# Patient Record
Sex: MALE | Race: OTHER | HISPANIC OR LATINO
[De-identification: names, ages, dates, MRNs, and addresses within clinical notes are randomized per-mention and may not be internally consistent; named-entity substitution may affect disease eponyms.]

---

## 2019-03-06 ENCOUNTER — TRANSCRIPTION ENCOUNTER (OUTPATIENT)
Age: 54
End: 2019-03-06

## 2019-04-04 ENCOUNTER — TRANSCRIPTION ENCOUNTER (OUTPATIENT)
Age: 54
End: 2019-04-04

## 2020-03-27 ENCOUNTER — TRANSCRIPTION ENCOUNTER (OUTPATIENT)
Age: 55
End: 2020-03-27

## 2020-04-04 ENCOUNTER — APPOINTMENT (OUTPATIENT)
Dept: DISASTER EMERGENCY | Facility: CLINIC | Age: 55
End: 2020-04-04
Payer: COMMERCIAL

## 2020-04-04 VITALS
SYSTOLIC BLOOD PRESSURE: 140 MMHG | OXYGEN SATURATION: 97 % | HEART RATE: 82 BPM | DIASTOLIC BLOOD PRESSURE: 80 MMHG | RESPIRATION RATE: 20 BRPM | TEMPERATURE: 97.7 F

## 2020-04-04 PROBLEM — Z00.00 ENCOUNTER FOR PREVENTIVE HEALTH EXAMINATION: Status: ACTIVE | Noted: 2020-04-04

## 2020-04-04 PROCEDURE — 99202 OFFICE O/P NEW SF 15 MIN: CPT

## 2020-04-04 NOTE — HISTORY OF PRESENT ILLNESS
[Patient presents to the office today for COVID-19 evaluation and testing.] : Patient presents to the office today for COVID-19 evaluation and testing. [] : mild dizziness on standing [With Confirmed Case] : patient exposed to a confirmed case of COVID-19 [None] : none [Clear] : clear [Good Air Entry] : good air entry [Speaks in full sentences] : speaks in full sentences [Normal O2 sat at rest] : normal O2 sat at rest [Grossly normal, interacts, not tired or weak] : grossly normal, interacts, not tired or weak [Discharged with current Quarantine instructions and advised of signs of worsening illness.] : Patient discharged with current quarantine instructions and advised of signs of worsening illness. Patient told to seek emergent care if symptoms occur. [[id number]] : headache, tightness chest

## 2020-05-02 ENCOUNTER — INPATIENT (INPATIENT)
Facility: HOSPITAL | Age: 55
LOS: 3 days | Discharge: HOME | DRG: 246 | End: 2020-05-06
Attending: HOSPITALIST
Payer: COMMERCIAL

## 2020-05-02 VITALS
HEART RATE: 68 BPM | TEMPERATURE: 98 F | DIASTOLIC BLOOD PRESSURE: 91 MMHG | SYSTOLIC BLOOD PRESSURE: 188 MMHG | OXYGEN SATURATION: 100 % | WEIGHT: 149.91 LBS | RESPIRATION RATE: 20 BRPM | HEIGHT: 65 IN

## 2020-05-02 DIAGNOSIS — I21.4 NON-ST ELEVATION (NSTEMI) MYOCARDIAL INFARCTION: ICD-10-CM

## 2020-05-02 DIAGNOSIS — R09.89 OTHER SPECIFIED SYMPTOMS AND SIGNS INVOLVING THE CIRCULATORY AND RESPIRATORY SYSTEMS: ICD-10-CM

## 2020-05-02 LAB
ALBUMIN SERPL ELPH-MCNC: 3.9 G/DL — SIGNIFICANT CHANGE UP (ref 3.3–5.2)
ALP SERPL-CCNC: 92 U/L — SIGNIFICANT CHANGE UP (ref 40–120)
ALT FLD-CCNC: 35 U/L — SIGNIFICANT CHANGE UP
ANION GAP SERPL CALC-SCNC: 17 MMOL/L — SIGNIFICANT CHANGE UP (ref 5–17)
AST SERPL-CCNC: 27 U/L — SIGNIFICANT CHANGE UP
BASOPHILS # BLD AUTO: 0.04 K/UL — SIGNIFICANT CHANGE UP (ref 0–0.2)
BASOPHILS NFR BLD AUTO: 0.5 % — SIGNIFICANT CHANGE UP (ref 0–2)
BILIRUB SERPL-MCNC: <0.2 MG/DL — LOW (ref 0.4–2)
BUN SERPL-MCNC: 15 MG/DL — SIGNIFICANT CHANGE UP (ref 8–20)
CALCIUM SERPL-MCNC: 9.7 MG/DL — SIGNIFICANT CHANGE UP (ref 8.6–10.2)
CHLORIDE SERPL-SCNC: 97 MMOL/L — LOW (ref 98–107)
CK SERPL-CCNC: 173 U/L — SIGNIFICANT CHANGE UP (ref 30–200)
CO2 SERPL-SCNC: 22 MMOL/L — SIGNIFICANT CHANGE UP (ref 22–29)
CREAT SERPL-MCNC: 0.88 MG/DL — SIGNIFICANT CHANGE UP (ref 0.5–1.3)
EOSINOPHIL # BLD AUTO: 0.16 K/UL — SIGNIFICANT CHANGE UP (ref 0–0.5)
EOSINOPHIL NFR BLD AUTO: 1.8 % — SIGNIFICANT CHANGE UP (ref 0–6)
GLUCOSE SERPL-MCNC: 191 MG/DL — HIGH (ref 70–99)
HCT VFR BLD CALC: 42.9 % — SIGNIFICANT CHANGE UP (ref 39–50)
HGB BLD-MCNC: 14.1 G/DL — SIGNIFICANT CHANGE UP (ref 13–17)
IMM GRANULOCYTES NFR BLD AUTO: 0.3 % — SIGNIFICANT CHANGE UP (ref 0–1.5)
LIDOCAIN IGE QN: 44 U/L — SIGNIFICANT CHANGE UP (ref 22–51)
LYMPHOCYTES # BLD AUTO: 2.37 K/UL — SIGNIFICANT CHANGE UP (ref 1–3.3)
LYMPHOCYTES # BLD AUTO: 27.1 % — SIGNIFICANT CHANGE UP (ref 13–44)
MCHC RBC-ENTMCNC: 28.1 PG — SIGNIFICANT CHANGE UP (ref 27–34)
MCHC RBC-ENTMCNC: 32.9 GM/DL — SIGNIFICANT CHANGE UP (ref 32–36)
MCV RBC AUTO: 85.6 FL — SIGNIFICANT CHANGE UP (ref 80–100)
MONOCYTES # BLD AUTO: 0.68 K/UL — SIGNIFICANT CHANGE UP (ref 0–0.9)
MONOCYTES NFR BLD AUTO: 7.8 % — SIGNIFICANT CHANGE UP (ref 2–14)
NEUTROPHILS # BLD AUTO: 5.45 K/UL — SIGNIFICANT CHANGE UP (ref 1.8–7.4)
NEUTROPHILS NFR BLD AUTO: 62.5 % — SIGNIFICANT CHANGE UP (ref 43–77)
PLATELET # BLD AUTO: 234 K/UL — SIGNIFICANT CHANGE UP (ref 150–400)
POTASSIUM SERPL-MCNC: 4.1 MMOL/L — SIGNIFICANT CHANGE UP (ref 3.5–5.3)
POTASSIUM SERPL-SCNC: 4.1 MMOL/L — SIGNIFICANT CHANGE UP (ref 3.5–5.3)
PROT SERPL-MCNC: 7.2 G/DL — SIGNIFICANT CHANGE UP (ref 6.6–8.7)
RBC # BLD: 5.01 M/UL — SIGNIFICANT CHANGE UP (ref 4.2–5.8)
RBC # FLD: 14.2 % — SIGNIFICANT CHANGE UP (ref 10.3–14.5)
SODIUM SERPL-SCNC: 136 MMOL/L — SIGNIFICANT CHANGE UP (ref 135–145)
TROPONIN T SERPL-MCNC: 0.24 NG/ML — HIGH (ref 0–0.06)
TROPONIN T SERPL-MCNC: <0.01 NG/ML — SIGNIFICANT CHANGE UP (ref 0–0.06)
WBC # BLD: 8.73 K/UL — SIGNIFICANT CHANGE UP (ref 3.8–10.5)
WBC # FLD AUTO: 8.73 K/UL — SIGNIFICANT CHANGE UP (ref 3.8–10.5)

## 2020-05-02 PROCEDURE — 99223 1ST HOSP IP/OBS HIGH 75: CPT

## 2020-05-02 PROCEDURE — 71275 CT ANGIOGRAPHY CHEST: CPT | Mod: 26

## 2020-05-02 PROCEDURE — 93010 ELECTROCARDIOGRAM REPORT: CPT | Mod: 76

## 2020-05-02 PROCEDURE — 71045 X-RAY EXAM CHEST 1 VIEW: CPT | Mod: 26

## 2020-05-02 PROCEDURE — 99285 EMERGENCY DEPT VISIT HI MDM: CPT

## 2020-05-02 RX ORDER — ASPIRIN/CALCIUM CARB/MAGNESIUM 324 MG
81 TABLET ORAL DAILY
Refills: 0 | Status: DISCONTINUED | OUTPATIENT
Start: 2020-05-03 | End: 2020-05-06

## 2020-05-02 RX ORDER — LISINOPRIL 2.5 MG/1
2.5 TABLET ORAL DAILY
Refills: 0 | Status: DISCONTINUED | OUTPATIENT
Start: 2020-05-03 | End: 2020-05-06

## 2020-05-02 RX ORDER — ATORVASTATIN CALCIUM 80 MG/1
40 TABLET, FILM COATED ORAL AT BEDTIME
Refills: 0 | Status: DISCONTINUED | OUTPATIENT
Start: 2020-05-02 | End: 2020-05-06

## 2020-05-02 RX ORDER — ENOXAPARIN SODIUM 100 MG/ML
68 INJECTION SUBCUTANEOUS ONCE
Refills: 0 | Status: COMPLETED | OUTPATIENT
Start: 2020-05-02 | End: 2020-05-02

## 2020-05-02 RX ORDER — METOPROLOL TARTRATE 50 MG
25 TABLET ORAL
Refills: 0 | Status: DISCONTINUED | OUTPATIENT
Start: 2020-05-03 | End: 2020-05-06

## 2020-05-02 RX ADMIN — ENOXAPARIN SODIUM 68 MILLIGRAM(S): 100 INJECTION SUBCUTANEOUS at 18:04

## 2020-05-02 RX ADMIN — ATORVASTATIN CALCIUM 40 MILLIGRAM(S): 80 TABLET, FILM COATED ORAL at 20:55

## 2020-05-02 NOTE — ED ADULT NURSE NOTE - OBJECTIVE STATEMENT
Patient reports he tested positive twice for Covid19, presents to ED with chest pressure and states he was diaphoretic.  Patient denies nausea, abdominal pain, diarrhea.  EKG performed, placed on cardiac monitor.  No difficulty breathing.

## 2020-05-02 NOTE — ED PROVIDER NOTE - PROGRESS NOTE DETAILS
2nd set results noted as postitive indicating NSTEMI.  Patient still with pain 5-6/10 in severity.  EKG in progress.  Patient states about 2 hours ago he began feeling severe symptoms similar to when he had to call 911 he alerted staff and was told his vitals were stable. Patient reports that he did have a CT scan 4 days ago as outpatient but was not given results.

## 2020-05-02 NOTE — H&P ADULT - HISTORY OF PRESENT ILLNESS
HPI: Patient is a 54 y/o male with a recent history of COVID19 infection admitted to the ED with "Chest Pressure" that had been present on and off for two which acutely worsened today with diaphoresis and light headedness. He is to be admitted with a finding of elevated troponin levels and for further evaluation for possible ACS and to r/o PE.     He was seen and examined at bedside, A and O X 3 and currently expericing the same, if milder sensation of a poorly described pressure-like sensation located midsternally and wrapping around both sides of his chest. He states that he first noted this sensation about two weeks as he was recovering from COVID19 infection (verified positive). The pain/pressure was initially mild, intermittent in duration, occuring both at rest and with activity and without any associated relieving/exacerbating factors. He denies any associated dyspnea on exertion,cough, sputum production, palpitations, nausea, vomiting, dizziness/light headeness or any syncopal episode. He had contacted a Pulmonary physician in the middle of April and was prescribed a five day course of Prednisone and an inhaler which did not alleviate his symptoms. He was sent in for what appears to be a CT-Angio Chest, presumable to r/o PE on 2020 but he does not know the result as yet. Today, at around 10:00 AM, he was walking outside at a normal level of activity when the chest pressure return but of severe intensity and continued, this time associated with light headeness without any sycope. He again denies any associated dyspnea or palpitations. EMS were summoned and patient recieved a loading dose of ASA en route. He denies any recent prolonged travel, leg swelling or calf tenderness and denies any personal or family history of blood clots. No prior history of known CAD, but he states that he has not seen a doctor in some time. He states that he might have had elevated cholesterol some time back but is currently not on any medication. He notes that his father  of heart complications in his 30s but no other family history. He is a non-smoker.     Course in ED: On admission, patient was normotensive, afebrile and saturating at 99% on Rm Air. He experienced a recurrence of chest pain at around 1 PM in the ED that resolved spontaneously. Labs on admission were notable for: Initial negative troponin which increased to .24 on subsequent draw. Hyperglycemia (190). EKG does not evidence any ST-T changes suggestive of ischemia. CXR is negative for any acute process. CT-Angiogram Chest PE protocol was ordered in ED and patient recieved one dose of therapeutic Lovenox.     ROS: 10 point review of systems was performed and is negative except as described in HPI

## 2020-05-02 NOTE — H&P ADULT - NSHPPHYSICALEXAM_GEN_ALL_CORE
General: Well built, well nourished Male, A and O x 3, In no acute distress  HEENT: Sclera anicteric, conjunctiva clear, oral mucosa moist, no JVD appreciated, no palpable lymphadenopathy  CVS: S1, S2 heard, regular rate and rhythm, no evident murmur  Lungs: Bilateral air entry without any wheezing or rales  Abdomen: Soft, non-tender, no rebound or other peritoneal signs, BS +  Extremities: Without edema or cyanosis, pedal pulses palpable  Skin: Without evident skin breaks or rashes  Neurologic: A and O X 3, No focal Neurological deficit appreciated

## 2020-05-02 NOTE — CONSULT NOTE ADULT - PROBLEM SELECTOR RECOMMENDATION 9
-trend cardiac markers  -continue therapeutic Lovenox, last dose at 1800 on Sunday  -NPO post midnight Sunday for cardiac cath on Monday  -EKG in am and with chest pain  -TTE in am  -start Metoprolol 25 mg bid  -start ASA 81 mg daily  -start Lipitor 40 mg qHS  -Hgb A1C, lipid panel, TSH in am    Preliminary evaluation, Dr. Edwards will see pt in am.

## 2020-05-02 NOTE — ED ADULT NURSE REASSESSMENT NOTE - NS ED NURSE REASSESS COMMENT FT1
Patient VSS, reports having "cold flashes", he also states "maybe it is a reaction to his inhaler this morning", states he took two puffs at 0730 today, patient is anxious-advised patient labs, vitals, ekg are within normal limits.

## 2020-05-02 NOTE — ED PROVIDER NOTE - OBJECTIVE STATEMENT
This patient is a 55 year old man with no significant past medical history who presents to the ER c/o chest pain, diaphoresis and nausea.  Patient is positive for covid 19.  He has been experiencing chest tightness for the past 2 weeks.  He states that he was initially prescribed prednisone and completed the course but his symptoms continued he was then prescribed an inhaler.  Today while walking around his yard he began feeling chest discomfort described as pressure over his entire chest.  Associated symptoms include diaphoresis and nausea.  Patient was given aspiring 324 mg of 4 mg of zofran en route to the hospital.

## 2020-05-02 NOTE — CONSULT NOTE ADULT - SUBJECTIVE AND OBJECTIVE BOX
History obtained by: Patient and medical record     obtained: No    Chief complaint:  "My chest hurts"    HPI:  This is 56 y/o male with recent COVID-19 infection (dx 20), not on any home medications who presents with midsternal chest pain. Pt was outside walking when he suddenly felt chest pain radiating to b/l jaws, associated with nausea and diaphoresis. Pt received  mg en route. Pt states he has been experiencing some chest heaviness for the past couple of weeks which was attributed to COVID-19 and he was treated with steroids and inhalers but today his chest pain was different and much more severe.   In the ER pt was found to have elevated second troponin 0.24 (first set negative). EKG shows SR with no acute ST changes. Chest CTA negative for PE. Chest pain is now improved without any intervention. Vital signs stable.        REVIEW OF SYMPTOMS:   Cardiovascular:  as per HPI  Respiratory:  denies dyspnea,   cough,     Genitourinary:  No dysuria, no hematuria;   Gastrointestinal:   No dark color stool, no melena, no diarrhea, no constipation, no abdominal pain;   Neurological: No headache, no dizziness, no slurred speech;    Psychiatric: No agitation, no anxiety.  ALL OTHER REVIEW OF SYSTEMS ARE NEGATIVE.    MEDICATIONS  (home); none        PAST MEDICAL & SURGICAL HISTORY:  No pertinent past medical history: Hyperlipidemia  No significant past surgical history      FAMILY HISTORY:  Father  of MI (?) at 37 y/o      SOCIAL HISTORY: lives with wife    CIGARETTES: never smoked    ALCOHOL: denies    DRUGS: denies    Vital Signs Last 24 Hrs  T(C): 37.2 (02 May 2020 20:42), Max: 37.2 (02 May 2020 20:42)  T(F): 98.9 (02 May 2020 20:42), Max: 98.9 (02 May 2020 20:42)  HR: 66 (02 May 2020 20:42) (58 - 74)  BP: 145/87 (02 May 2020 20:42) (129/73 - 188/91)  BP(mean): --  RR: 18 (02 May 2020 20:42) (18 - 20)  SpO2: 96% (02 May 2020 20:42) (96% - 100%)    PHYSICAL EXAM:  General: WN/WD NAD  Neurology: A&Ox3, nonfocal, BOWLES x 4  Eyes: PERRL/ EOMI, Gross vision intact  ENT/Neck: Neck supple, trachea midline, No JVD, Gross hearing intact  Respiratory: CTA B/L, No wheezing, rales, rhonchi  CV: RRR, S1S2, no murmurs, rubs or gallops  Abdominal: Soft, NT, ND +BS,   Extremities: No edema, + peripheral pulses  Skin: No Rashes, Hematoma, Ecchymosis        INTERPRETATION OF TELEMETRY: SR 60's-70's with occasional PVCs    ECG: SB 59 bpm, no acute ST changes        I&O's Detail      LABS:                        14.1   8.73  )-----------( 234      ( 02 May 2020 11:50 )             42.9     05-    136  |  97<L>  |  15.0  ----------------------------<  191<H>  4.1   |  22.0  |  0.88    Ca    9.7      02 May 2020 11:50    TPro  7.2  /  Alb  3.9  /  TBili  <0.2<L>  /  DBili  x   /  AST  27  /  ALT  35  /  AlkPhos  92  05-02    CARDIAC MARKERS ( 02 May 2020 15:36 )  x     / 0.24 ng/mL / x     / x     / x      CARDIAC MARKERS ( 02 May 2020 11:50 )  x     / <0.01 ng/mL / 173 U/L / x     / 2.7 ng/mL          I&O's Summary      RADIOLOGY & ADDITIONAL STUDIES:  X-ray:    < from: CT Angio Chest w/ IV Cont (20 @ 20:27) >  IMPRESSION:     CT findings of pulmonary infection/inflammation compatible with many entities (C19V-2).    No pulmonary embolus.    < end of copied text >    PREVIOUS DIAGNOSTIC TESTING:      ECHO: n/a    STRESS: n/a      CATHETERIZATION: n/a

## 2020-05-02 NOTE — ED ADULT TRIAGE NOTE - CHIEF COMPLAINT QUOTE
pt BIBA from home with c/o 5/10 midsternal chest pain, diaphoresis & nausea that started this AM. Pt tested + for covid on 4/5 and 4/23. Pt rcvd 324 ASA and 4mg of zofran by EMS and states started albuterol inhaler 3 days ago. Pt states history of heart murmur. No s/s of respiratory distress noted.

## 2020-05-02 NOTE — ED ADULT NURSE REASSESSMENT NOTE - NS ED NURSE REASSESS COMMENT FT1
Pt received @ 1930, pt a&ox4 , on cardiac monitor/  VSS. afebrile, no complaints of sob or do at this time. pt had an episode of cp/dizziness today and increased trop., pt pending urgent CTA. pt is admitted to 5twr, pt is covid +, isolation precautions maintained. pt to be NPO @ midnight for SAURABH, 5twr Rn aware.

## 2020-05-02 NOTE — H&P ADULT - ASSESSMENT
Chest pain/pressure; Atypical, Non-anginal; R/o ACS  Elevated troponin; Sarabjit pattern; NSTEMI, r/o Pulmonary Embolism  Recent COVID19 infection; Potential sequelae  H/o Hyperlipidemia; Not on treatment  - Telemetry monitoring  - Trend Serial Troponins q 6 hrly; EKG STAT for any recurrence of Chest pain  - CT-Angio Chest STAT  - S/p one dose 1 mg/kg Lovenox, continue pending Cadiology eval and CT-Angio result  - Aspirin loading dose 325 mg given with EMS, continue 81 mg daily  - Lipid profile; Lipitor 40 mg HS  - Metoprolol 12.5 mg BID, Lisinopril 5 mg daily with holding parameters  - 2-D ECHO to evaluate LV function/WMA  - Cardiology consultation  - Lifestyle intervention    Hyperglycemia, r/o DM Type 2  - Check HbA1c    Prophylaxis: Lovenox, one dose given, continue appropriate pharmacological Ppx pending studies/consult, SCDs    Diet: Cardiac diet, NPO past midnight    Activity: As tolerated    Code Status: FULL    More than 60 minutes were spend in the evaluation and care of the patient    Relevant diagnositcs and plan of treatment were discussed with the patient and available family to the best of my ability and pertinent questions were answered.    Patient has been screened for the following if applicable:   - Smoking:   - HIV status:   - Hypertension Chest pain/pressure; Atypical, Non-anginal; R/o ACS  Elevated troponin; Sarabjit pattern; NSTEMI, r/o Pulmonary Embolism  Recent COVID19 infection; Potential sequelae  H/o Hyperlipidemia; Not on treatment  - Telemetry monitoring  - Trend Serial Troponins q 6 hrly; EKG STAT for any recurrence of Chest pain  - CT-Angio Chest STAT  - S/p one dose 1 mg/kg Lovenox, continue pending Cadiology eval and CT-Angio result  - Aspirin loading dose 325 mg given with EMS, continue 81 mg daily  - Lipid profile; Lipitor 40 mg HS  - Metoprolol 12.5 mg BID, Lisinopril 5 mg daily with holding parameters  - 2-D ECHO to evaluate LV function/WMA  - Cardiology consultation: [facility] consult placed  - Lifestyle intervention    Hyperglycemia, r/o DM Type 2  - Check HbA1c    Prophylaxis: Lovenox, one dose given, continue appropriate pharmacological Ppx pending studies/consult, SCDs    Diet: Cardiac diet, NPO past midnight    Activity: As tolerated    Code Status: FULL    More than 60 minutes were spend in the evaluation and care of the patient    Relevant diagnositcs and plan of treatment were discussed with the patient and available family to the best of my ability and pertinent questions were answered.    Patient has been screened for the following if applicable:   - Smoking:   - HIV status:   - Hypertension

## 2020-05-02 NOTE — ED PROVIDER NOTE - CLINICAL SUMMARY MEDICAL DECISION MAKING FREE TEXT BOX
55 year old man with chest pain, SOB and diaphoresis.  Patient well appearing at this time, lungs clear EKG non-ischemic.  1st troponin negative 2nd set of troponin positive.  Patient given lovenox and admitted.

## 2020-05-03 LAB
A1C WITH ESTIMATED AVERAGE GLUCOSE RESULT: 5.9 % — HIGH (ref 4–5.6)
ANION GAP SERPL CALC-SCNC: 17 MMOL/L — SIGNIFICANT CHANGE UP (ref 5–17)
BUN SERPL-MCNC: 11 MG/DL — SIGNIFICANT CHANGE UP (ref 8–20)
CALCIUM SERPL-MCNC: 9.6 MG/DL — SIGNIFICANT CHANGE UP (ref 8.6–10.2)
CHLORIDE SERPL-SCNC: 100 MMOL/L — SIGNIFICANT CHANGE UP (ref 98–107)
CHOLEST SERPL-MCNC: 233 MG/DL — HIGH (ref 110–199)
CO2 SERPL-SCNC: 24 MMOL/L — SIGNIFICANT CHANGE UP (ref 22–29)
CREAT SERPL-MCNC: 0.86 MG/DL — SIGNIFICANT CHANGE UP (ref 0.5–1.3)
CRP SERPL-MCNC: <0.4 MG/DL — SIGNIFICANT CHANGE UP (ref 0–0.4)
D DIMER BLD IA.RAPID-MCNC: <150 NG/ML DDU — SIGNIFICANT CHANGE UP
ESTIMATED AVERAGE GLUCOSE: 123 MG/DL — HIGH (ref 68–114)
FERRITIN SERPL-MCNC: 134 NG/ML — SIGNIFICANT CHANGE UP (ref 30–400)
GLUCOSE SERPL-MCNC: 94 MG/DL — SIGNIFICANT CHANGE UP (ref 70–99)
HCT VFR BLD CALC: 46.8 % — SIGNIFICANT CHANGE UP (ref 39–50)
HDLC SERPL-MCNC: 41 MG/DL — SIGNIFICANT CHANGE UP
HGB BLD-MCNC: 15.1 G/DL — SIGNIFICANT CHANGE UP (ref 13–17)
LIPID PNL WITH DIRECT LDL SERPL: 155 MG/DL — SIGNIFICANT CHANGE UP
MCHC RBC-ENTMCNC: 27.8 PG — SIGNIFICANT CHANGE UP (ref 27–34)
MCHC RBC-ENTMCNC: 32.3 GM/DL — SIGNIFICANT CHANGE UP (ref 32–36)
MCV RBC AUTO: 86.2 FL — SIGNIFICANT CHANGE UP (ref 80–100)
PLATELET # BLD AUTO: 241 K/UL — SIGNIFICANT CHANGE UP (ref 150–400)
POTASSIUM SERPL-MCNC: 4.3 MMOL/L — SIGNIFICANT CHANGE UP (ref 3.5–5.3)
POTASSIUM SERPL-SCNC: 4.3 MMOL/L — SIGNIFICANT CHANGE UP (ref 3.5–5.3)
PROCALCITONIN SERPL-MCNC: 0.05 NG/ML — SIGNIFICANT CHANGE UP (ref 0.02–0.1)
RBC # BLD: 5.43 M/UL — SIGNIFICANT CHANGE UP (ref 4.2–5.8)
RBC # FLD: 14.6 % — HIGH (ref 10.3–14.5)
SARS-COV-2 RNA SPEC QL NAA+PROBE: DETECTED
SODIUM SERPL-SCNC: 140 MMOL/L — SIGNIFICANT CHANGE UP (ref 135–145)
TOTAL CHOLESTEROL/HDL RATIO MEASUREMENT: 6 RATIO — SIGNIFICANT CHANGE UP (ref 3.4–9.6)
TRIGL SERPL-MCNC: 186 MG/DL — SIGNIFICANT CHANGE UP (ref 10–200)
TROPONIN T SERPL-MCNC: 3.16 NG/ML — HIGH (ref 0–0.06)
TROPONIN T SERPL-MCNC: 3.8 NG/ML — HIGH (ref 0–0.06)
TSH SERPL-MCNC: 4.74 UIU/ML — HIGH (ref 0.27–4.2)
WBC # BLD: 9.75 K/UL — SIGNIFICANT CHANGE UP (ref 3.8–10.5)
WBC # FLD AUTO: 9.75 K/UL — SIGNIFICANT CHANGE UP (ref 3.8–10.5)

## 2020-05-03 PROCEDURE — 99233 SBSQ HOSP IP/OBS HIGH 50: CPT

## 2020-05-03 PROCEDURE — 93010 ELECTROCARDIOGRAM REPORT: CPT

## 2020-05-03 PROCEDURE — 99222 1ST HOSP IP/OBS MODERATE 55: CPT

## 2020-05-03 PROCEDURE — 93306 TTE W/DOPPLER COMPLETE: CPT | Mod: 26

## 2020-05-03 RX ORDER — ENOXAPARIN SODIUM 100 MG/ML
68 INJECTION SUBCUTANEOUS
Refills: 0 | Status: DISCONTINUED | OUTPATIENT
Start: 2020-05-03 | End: 2020-05-03

## 2020-05-03 RX ORDER — ENOXAPARIN SODIUM 100 MG/ML
70 INJECTION SUBCUTANEOUS
Refills: 0 | Status: COMPLETED | OUTPATIENT
Start: 2020-05-03 | End: 2020-05-03

## 2020-05-03 RX ADMIN — Medication 81 MILLIGRAM(S): at 18:02

## 2020-05-03 RX ADMIN — ENOXAPARIN SODIUM 70 MILLIGRAM(S): 100 INJECTION SUBCUTANEOUS at 18:02

## 2020-05-03 RX ADMIN — ENOXAPARIN SODIUM 70 MILLIGRAM(S): 100 INJECTION SUBCUTANEOUS at 05:39

## 2020-05-03 RX ADMIN — Medication 25 MILLIGRAM(S): at 05:39

## 2020-05-03 RX ADMIN — ATORVASTATIN CALCIUM 40 MILLIGRAM(S): 80 TABLET, FILM COATED ORAL at 21:46

## 2020-05-03 RX ADMIN — Medication 25 MILLIGRAM(S): at 18:03

## 2020-05-03 RX ADMIN — LISINOPRIL 2.5 MILLIGRAM(S): 2.5 TABLET ORAL at 05:39

## 2020-05-03 NOTE — PROGRESS NOTE ADULT - SUBJECTIVE AND OBJECTIVE BOX
CC: Chest pain. Elevated troponins. Recent covid infection.   HPI:  HPI: Patient is a 56 y/o male with a recent history of COVID19 infection admitted to the ED with "Chest Pressure" that had been present on and off for two which acutely worsened today with diaphoresis and light headedness. He is to be admitted with a finding of elevated troponin levels and for further evaluation for possible ACS and to r/o PE.     He was seen and examined at bedside, A and O X 3 and currently expericing the same, if milder sensation of a poorly described pressure-like sensation located midsternally and wrapping around both sides of his chest. He states that he first noted this sensation about two weeks as he was recovering from COVID19 infection (verified positive). The pain/pressure was initially mild, intermittent in duration, occuring both at rest and with activity and without any associated relieving/exacerbating factors. He denies any associated dyspnea on exertion,cough, sputum production, palpitations, nausea, vomiting, dizziness/light headeness or any syncopal episode. He had contacted a Pulmonary physician in the middle of April and was prescribed a five day course of Prednisone and an inhaler which did not alleviate his symptoms. He was sent in for what appears to be a CT-Angio Chest, presumable to r/o PE on 2020 but he does not know the result as yet. Today, at around 10:00 AM, he was walking outside at a normal level of activity when the chest pressure return but of severe intensity and continued, this time associated with light headeness without any sycope. He again denies any associated dyspnea or palpitations. EMS were summoned and patient recieved a loading dose of ASA en route. He denies any recent prolonged travel, leg swelling or calf tenderness and denies any personal or family history of blood clots. No prior history of known CAD, but he states that he has not seen a doctor in some time. He states that he might have had elevated cholesterol some time back but is currently not on any medication. He notes that his father  of heart complications in his 30s but no other family history. He is a non-smoker.     Course in ED: On admission, patient was normotensive, afebrile and saturating at 99% on Rm Air. He experienced a recurrence of chest pain at around 1 PM in the ED that resolved spontaneously. Labs on admission were notable for: Initial negative troponin which increased to .24 on subsequent draw. Hyperglycemia (190). EKG does not evidence any ST-T changes suggestive of ischemia. CXR is negative for any acute process. CT-Angiogram Chest PE protocol was ordered in ED and patient recieved one dose of therapeutic Lovenox.     ROS: 10 point review of systems was performed and is negative except as described in HPI (02 May 2020 20:23)    REVIEW OF SYSTEMS:    Patient denied fever, chills, abdominal pain, nausea, vomiting, cough, shortness of breath, chest pain or palpitations    Vital Signs Last 24 Hrs  T(C): 36.5 (03 May 2020 08:25), Max: 37.2 (02 May 2020 20:42)  T(F): 97.7 (03 May 2020 08:25), Max: 98.9 (02 May 2020 20:42)  HR: 64 (03 May 2020 08:25) (64 - 74)  BP: 130/81 (03 May 2020 08:25) (130/81 - 145/87)  BP(mean): --  RR: 18 (02 May 2020 20:42) (18 - 20)  SpO2: 96% (02 May 2020 20:42) (96% - 99%)I&O's Summary    PHYSICAL EXAM:  GENERAL: NAD, well-groomed  HEENT: PERRL, +EOMI, anicteric, no [place]  NECK: Supple, No JVD   CHEST/LUNG: CTA bilaterally; Normal effort  HEART: S1S2 Normal intensity, no murmurs, gallops or rubs noted  ABDOMEN: Soft, BS Normoactive, NT, ND, no HSM noted  EXTREMITIES:  2+ radial and DP pulses noted, no clubbing, cyanosis, or edema noted, FROM x 4  SKIN: No rashes or lesions noted  NEURO: A&Ox3, no focal deficits noted, CN II-XII intact  PSYCH: normal mood and affect; insight/judgement appropriate  LABS:                        15.1   9.75  )-----------( 241      ( 03 May 2020 09:07 )             46.8     05-03    140  |  100  |  11.0  ----------------------------<  94  4.3   |  24.0  |  0.86    Ca    9.6      03 May 2020 08:56    TPro  7.2  /  Alb  3.9  /  TBili  <0.2<L>  /  DBili  x   /  AST  27  /  ALT  35  /  AlkPhos  92          RADIOLOGY & ADDITIONAL TESTS:    MEDICATIONS:  MEDICATIONS  (STANDING):  aspirin enteric coated 81 milliGRAM(s) Oral daily  atorvastatin 40 milliGRAM(s) Oral at bedtime  enoxaparin Injectable 70 milliGRAM(s) SubCutaneous <User Schedule>  lisinopril 2.5 milliGRAM(s) Oral daily  metoprolol tartrate 25 milliGRAM(s) Oral two times a day    MEDICATIONS  (PRN):

## 2020-05-03 NOTE — CHART NOTE - NSCHARTNOTEFT_GEN_A_CORE
I spoke to Dr Milner who saw the patient today.    I did not see the patient because of pending COVID19 results.    Per Dr Milner- Patient was doing fine, no chest pain. Patients exam refer to Dr Milner's note.    Patient is on optimal medical therapy for NSTEMI.    For Cath on Monday

## 2020-05-03 NOTE — PROGRESS NOTE ADULT - ASSESSMENT
Chest pain likely NSTEMI  Elevated troponin  Recent COVID19 infection; Potential sequelae  serial troponin  - CT-Angio Chest NO PE  - 2-D ECHO to evaluate LV function/WMA  - Cardiology consultation: [facility] cardiology- will be NPO after midnight for Cardiac cath in am 5/4  Aspirin , statin , metoprolol  - Telemetry monitoring    H/o Hyperlipidemia;  Lipitor 40mg po daily     Hypertension   - Metoprolol 12.5 mg BID, Lisinopril 5 mg daily with holding parameters    Hyperglycemia, r/o DM Type 2  - HbA1c 5.9

## 2020-05-04 LAB
ALBUMIN SERPL ELPH-MCNC: 4 G/DL — SIGNIFICANT CHANGE UP (ref 3.3–5.2)
ALP SERPL-CCNC: 92 U/L — SIGNIFICANT CHANGE UP (ref 40–120)
ALT FLD-CCNC: 50 U/L — HIGH
ANION GAP SERPL CALC-SCNC: 14 MMOL/L — SIGNIFICANT CHANGE UP (ref 5–17)
APTT BLD: 34.5 SEC — SIGNIFICANT CHANGE UP (ref 27.5–36.3)
AST SERPL-CCNC: 94 U/L — HIGH
BILIRUB SERPL-MCNC: 0.4 MG/DL — SIGNIFICANT CHANGE UP (ref 0.4–2)
BUN SERPL-MCNC: 20 MG/DL — SIGNIFICANT CHANGE UP (ref 8–20)
CALCIUM SERPL-MCNC: 9.7 MG/DL — SIGNIFICANT CHANGE UP (ref 8.6–10.2)
CHLORIDE SERPL-SCNC: 98 MMOL/L — SIGNIFICANT CHANGE UP (ref 98–107)
CO2 SERPL-SCNC: 24 MMOL/L — SIGNIFICANT CHANGE UP (ref 22–29)
CREAT SERPL-MCNC: 0.93 MG/DL — SIGNIFICANT CHANGE UP (ref 0.5–1.3)
GLUCOSE SERPL-MCNC: 87 MG/DL — SIGNIFICANT CHANGE UP (ref 70–99)
HCT VFR BLD CALC: 46 % — SIGNIFICANT CHANGE UP (ref 39–50)
HGB BLD-MCNC: 14.9 G/DL — SIGNIFICANT CHANGE UP (ref 13–17)
INR BLD: 0.97 RATIO — SIGNIFICANT CHANGE UP (ref 0.88–1.16)
MCHC RBC-ENTMCNC: 28.2 PG — SIGNIFICANT CHANGE UP (ref 27–34)
MCHC RBC-ENTMCNC: 32.4 GM/DL — SIGNIFICANT CHANGE UP (ref 32–36)
MCV RBC AUTO: 87.1 FL — SIGNIFICANT CHANGE UP (ref 80–100)
PLATELET # BLD AUTO: 224 K/UL — SIGNIFICANT CHANGE UP (ref 150–400)
POTASSIUM SERPL-MCNC: 4.2 MMOL/L — SIGNIFICANT CHANGE UP (ref 3.5–5.3)
POTASSIUM SERPL-SCNC: 4.2 MMOL/L — SIGNIFICANT CHANGE UP (ref 3.5–5.3)
PROT SERPL-MCNC: 7.4 G/DL — SIGNIFICANT CHANGE UP (ref 6.6–8.7)
PROTHROM AB SERPL-ACNC: 11 SEC — SIGNIFICANT CHANGE UP (ref 10–12.9)
RBC # BLD: 5.28 M/UL — SIGNIFICANT CHANGE UP (ref 4.2–5.8)
RBC # FLD: 14.6 % — HIGH (ref 10.3–14.5)
SODIUM SERPL-SCNC: 136 MMOL/L — SIGNIFICANT CHANGE UP (ref 135–145)
WBC # BLD: 9.02 K/UL — SIGNIFICANT CHANGE UP (ref 3.8–10.5)
WBC # FLD AUTO: 9.02 K/UL — SIGNIFICANT CHANGE UP (ref 3.8–10.5)

## 2020-05-04 PROCEDURE — 99233 SBSQ HOSP IP/OBS HIGH 50: CPT

## 2020-05-04 PROCEDURE — 92941 PRQ TRLML REVSC TOT OCCL AMI: CPT | Mod: LD

## 2020-05-04 PROCEDURE — 93010 ELECTROCARDIOGRAM REPORT: CPT | Mod: 76

## 2020-05-04 PROCEDURE — 99152 MOD SED SAME PHYS/QHP 5/>YRS: CPT

## 2020-05-04 PROCEDURE — 92928 PRQ TCAT PLMT NTRAC ST 1 LES: CPT | Mod: LD,59

## 2020-05-04 PROCEDURE — 93458 L HRT ARTERY/VENTRICLE ANGIO: CPT | Mod: 26,59

## 2020-05-04 RX ORDER — TICAGRELOR 90 MG/1
90 TABLET ORAL EVERY 12 HOURS
Refills: 0 | Status: DISCONTINUED | OUTPATIENT
Start: 2020-05-05 | End: 2020-05-06

## 2020-05-04 RX ORDER — FENTANYL CITRATE 50 UG/ML
25 INJECTION INTRAVENOUS
Refills: 0 | Status: DISCONTINUED | OUTPATIENT
Start: 2020-05-04 | End: 2020-05-05

## 2020-05-04 RX ORDER — ACETAMINOPHEN 500 MG
1000 TABLET ORAL ONCE
Refills: 0 | Status: COMPLETED | OUTPATIENT
Start: 2020-05-04 | End: 2020-05-04

## 2020-05-04 RX ORDER — TICAGRELOR 90 MG/1
90 TABLET ORAL EVERY 12 HOURS
Refills: 0 | Status: DISCONTINUED | OUTPATIENT
Start: 2020-05-04 | End: 2020-05-04

## 2020-05-04 RX ORDER — ASPIRIN/CALCIUM CARB/MAGNESIUM 324 MG
81 TABLET ORAL DAILY
Refills: 0 | Status: DISCONTINUED | OUTPATIENT
Start: 2020-05-04 | End: 2020-05-04

## 2020-05-04 RX ADMIN — Medication 81 MILLIGRAM(S): at 10:13

## 2020-05-04 RX ADMIN — FENTANYL CITRATE 25 MICROGRAM(S): 50 INJECTION INTRAVENOUS at 19:12

## 2020-05-04 RX ADMIN — LISINOPRIL 2.5 MILLIGRAM(S): 2.5 TABLET ORAL at 18:44

## 2020-05-04 RX ADMIN — Medication 25 MILLIGRAM(S): at 17:58

## 2020-05-04 RX ADMIN — Medication 400 MILLIGRAM(S): at 19:58

## 2020-05-04 RX ADMIN — ATORVASTATIN CALCIUM 40 MILLIGRAM(S): 80 TABLET, FILM COATED ORAL at 23:55

## 2020-05-04 NOTE — PROGRESS NOTE ADULT - SUBJECTIVE AND OBJECTIVE BOX
CC: Chest pain , positive troponin. ACS.    HPI:  HPI: Patient is a 56 y/o male with a recent history of COVID19 infection admitted to the ED with "Chest Pressure" that had been present on and off for two which acutely worsened today with diaphoresis and light headedness. He is to be admitted with a finding of elevated troponin levels and for further evaluation for possible ACS and to r/o PE.     He was seen and examined at bedside, A and O X 3 and currently expericing the same, if milder sensation of a poorly described pressure-like sensation located midsternally and wrapping around both sides of his chest. He states that he first noted this sensation about two weeks as he was recovering from COVID19 infection (verified positive). The pain/pressure was initially mild, intermittent in duration, occuring both at rest and with activity and without any associated relieving/exacerbating factors. He denies any associated dyspnea on exertion,cough, sputum production, palpitations, nausea, vomiting, dizziness/light headeness or any syncopal episode. He had contacted a Pulmonary physician in the middle of April and was prescribed a five day course of Prednisone and an inhaler which did not alleviate his symptoms. He was sent in for what appears to be a CT-Angio Chest, presumable to r/o PE on 2020 but he does not know the result as yet. Today, at around 10:00 AM, he was walking outside at a normal level of activity when the chest pressure return but of severe intensity and continued, this time associated with light headeness without any sycope. He again denies any associated dyspnea or palpitations. EMS were summoned and patient recieved a loading dose of ASA en route. He denies any recent prolonged travel, leg swelling or calf tenderness and denies any personal or family history of blood clots. No prior history of known CAD, but he states that he has not seen a doctor in some time. He states that he might have had elevated cholesterol some time back but is currently not on any medication. He notes that his father  of heart complications in his 30s but no other family history. He is a non-smoker.     Course in ED: On admission, patient was normotensive, afebrile and saturating at 99% on Rm Air. He experienced a recurrence of chest pain at around 1 PM in the ED that resolved spontaneously. Labs on admission were notable for: Initial negative troponin which increased to .24 on subsequent draw. Hyperglycemia (190). EKG does not evidence any ST-T changes suggestive of ischemia. CXR is negative for any acute process. CT-Angiogram Chest PE protocol was ordered in ED and patient recieved one dose of therapeutic Lovenox.     ROS: 10 point review of systems was performed and is negative except as described in HPI (02 May 2020 20:23)    REVIEW OF SYSTEMS:    Patient denied fever, chills, abdominal pain, nausea, vomiting, cough, shortness of breath, chest pain or palpitations    Vital Signs Last 24 Hrs  T(C): 36.8 (04 May 2020 08:25), Max: 36.8 (04 May 2020 08:25)  T(F): 98.3 (04 May 2020 08:25), Max: 98.3 (04 May 2020 08:25)  HR: 88 (04 May 2020 08:25) (55 - 97)  BP: 131/77 (04 May 2020 08:25) (103/70 - 131/77)  BP(mean): --  RR: 20 (04 May 2020 08:25) (18 - 20)  SpO2: 95% (04 May 2020 08:25) (95% - 98%)I&O's Summary    PHYSICAL EXAM:  GENERAL: NAD, well-groomed  HEENT: PERRL, +EOMI, anicteric, no [place]  NECK: Supple, No JVD   CHEST/LUNG: CTA bilaterally; Normal effort  HEART: S1S2 Normal intensity, no murmurs, gallops or rubs noted  ABDOMEN: Soft, BS Normoactive, NT, ND, no HSM noted  EXTREMITIES:  2+ radial and DP pulses noted, no clubbing, cyanosis, or edema noted, FROM x 4  SKIN: No rashes or lesions noted  NEURO: A&Ox3, no focal deficits noted, CN II-XII intact  PSYCH: normal mood and affect; insight/judgement appropriate  LABS:                        14.9   9.02  )-----------( 224      ( 04 May 2020 08:14 )             46.0     05-04    136  |  98  |  20.0  ----------------------------<  87  4.2   |  24.0  |  0.93    Ca    9.7      04 May 2020 08:14    TPro  7.4  /  Alb  4.0  /  TBili  0.4  /  DBili  x   /  AST  94<H>  /  ALT  50<H>  /  AlkPhos  92  05-04    PT/INR - ( 04 May 2020 11:59 )   PT: 11.0 sec;   INR: 0.97 ratio         PTT - ( 04 May 2020 11:59 )  PTT:34.5 sec    RADIOLOGY & ADDITIONAL TESTS:    MEDICATIONS:  MEDICATIONS  (STANDING):  aspirin enteric coated 81 milliGRAM(s) Oral daily  atorvastatin 40 milliGRAM(s) Oral at bedtime  lisinopril 2.5 milliGRAM(s) Oral daily  metoprolol tartrate 25 milliGRAM(s) Oral two times a day    MEDICATIONS  (PRN):

## 2020-05-04 NOTE — PROGRESS NOTE ADULT - ASSESSMENT
Chest pain likely NSTEMI  Elevated serial troponin  Recent COVID19 infection; Potential sequelae  Schedule for heart cath 5/4 by cardiology     - CT-Angio Chest NO PE  - 2-D ECHO to evaluate LV function/WMA  - Cardiology consultation: [facility] cardiology- will be NPO after midnight for Cardiac cath in am 5/4  Aspirin , statin , metoprolol  - Telemetry monitoring    H/o Hyperlipidemia;  Lipitor 40mg po daily     Hypertension   - Metoprolol 12.5 mg BID, Lisinopril 5 mg daily with holding parameters    Hyperglycemia, r/o DM Type 2  - HbA1c 5.9

## 2020-05-04 NOTE — CHART NOTE - NSCHARTNOTEFT_GEN_A_CORE
Called by RN for reported pain by pt at [facility] site  EXAM:  RR site DSD with pulse ox on right index finger reflecting strong/steady pleth w/Sa02>96%  No neurovascular deficit rt hand Capillary refill <3 sec.  Proximal to site exquisite tenderness illicited with palpation. No swelling Soft flat ecchymosis appreciated. Manual pressure held at site x20 minutes followed by application of ace bandage and elevation with noted improvement. Called by RN for reported pain by pt at [facility] site  EXAM:  RR site DSD with pulse ox on right index finger reflecting strong/steady pleth w/Sa02>96%  No neurovascular deficit rt hand Capillary refill <3 sec.  Proximal to site exquisite tenderness illicited with palpation. No swelling Soft flat ecchymosis appreciated. Manual pressure held at site x20 minutes followed by application of ace bandage and elevation with noted improvement. IV acetaminophen given for comfort.

## 2020-05-04 NOTE — PROGRESS NOTE ADULT - SUBJECTIVE AND OBJECTIVE BOX
Department of Cardiology                                                                  [facility]/[place] [address] [facility]                                                            Telephone: [phone]. Fax:[phone]                                                                                         POST PCI NOTE         HPI:  HPI: Patient is a 56 y/o male with a recent history of COVID19 infection admitted to the ED with "Chest Pressure" that had been present on and off for two which acutely worsened today with diaphoresis and light headedness. He is to be admitted with a finding of elevated troponin levels and for further evaluation for possible ACS and to r/o PE.     He was seen and examined at bedside, A and O X 3 and currently expericing the same, if milder sensation of a poorly described pressure-like sensation located midsternally and wrapping around both sides of his chest. He states that he first noted this sensation about two weeks as he was recovering from COVID19 infection (verified positive). The pain/pressure was initially mild, intermittent in duration, occuring both at rest and with activity and without any associated relieving/exacerbating factors. He denies any associated dyspnea on exertion,cough, sputum production, palpitations, nausea, vomiting, dizziness/light headeness or any syncopal episode. He had contacted a Pulmonary physician in the middle of April and was prescribed a five day course of Prednisone and an inhaler which did not alleviate his symptoms. He was sent in for what appears to be a CT-Angio Chest, presumable to r/o PE on 2020 but he does not know the result as yet. Today, at around 10:00 AM, he was walking outside at a normal level of activity when the chest pressure return but of severe intensity and continued, this time associated with light headeness without any sycope. He again denies any associated dyspnea or palpitations. EMS were summoned and patient recieved a loading dose of ASA en route. He denies any recent prolonged travel, leg swelling or calf tenderness and denies any personal or family history of blood clots. No prior history of known CAD, but he states that he has not seen a doctor in some time. He states that he might have had elevated cholesterol some time back but is currently not on any medication. He notes that his father  of heart complications in his 30s but no other family history. He is a non-smoker.     Course in ED: On admission, patient was normotensive, afebrile and saturating at 99% on Rm Air. He experienced a recurrence of chest pain at around 1 PM in the ED that resolved spontaneously. Labs on admission were notable for: Initial negative troponin which increased to .24 on subsequent draw. Hyperglycemia (190). EKG does not evidence any ST-T changes suggestive of ischemia. CXR is negative for any acute process. CT-Angiogram Chest PE protocol was ordered in ED and patient recieved one dose of therapeutic Lovenox.     Today Post Cath via right radial site no chest pain no SOB   PCI to LCX and Diag 1     PAST MEDICAL & SURGICAL HISTORY:  No pertinent past medical history: Hyperlipidemia  No significant past surgical history    FAMILY HISTORY:  No pertinent family history in first degree relatives: Family history of Cardiac complications of unknown type in his father in his 30s.    HEALTH ISSUES - PROBLEM Dx:  NSTEMI (non-ST elevated myocardial infarction): NSTEMI (non-ST elevated myocardial infarction)  Suspected pulmonary embolism      No Known Allergies      Objective:  Vital Signs Last 24 Hrs  T(C): 36.8 (04 May 2020 08:25), Max: 36.8 (04 May 2020 08:25)  T(F): 98.3 (04 May 2020 08:25), Max: 98.3 (04 May 2020 08:25)  HR: 56 (04 May 2020 16:30) (55 - 97)  BP: 134/82 (04 May 2020 16:30) (103/70 - 143/85)  BP(mean): --  RR: 16 (04 May 2020 16:30) (16 - 20)  SpO2: 96% (04 May 2020 16:30) (95% - 98%)    CM: SR  Neuro: A&OX3, CN 2-12 intact  HEENT: NC, AT  Lungs: CTA B/L  CV: S1, S2, no murmur, RRR  Abd: Soft  Right Groin: Soft, no bleeding, no hematoma  Extremity: + distal pulses  EKG: Sinus no changes noted                         14.9   9.02  )-----------( 224      ( 04 May 2020 08:14 )             46.0     05-    136  |  98  |  20.0  ----------------------------<  87  4.2   |  24.0  |  0.93    Ca    9.7      04 May 2020 08:14    TPro  7.4  /  Alb  4.0  /  TBili  0.4  /  DBili  x   /  AST  94<H>  /  ALT  50<H>  /  AlkPhos  92  05-04    PT/INR - ( 04 May 2020 11:59 )   PT: 11.0 sec;   INR: 0.97 ratio         PTT - ( 04 May 2020 11:59 )  PTT:34.5 sec    Assessment   54 yo male post PCI to the LCX and Diag. will continue on Brilinta and aspirin .     PLAN:  -   - bedrest for  1   hours  -cardiac rehab info provided/referral and communication to cardiac rehab completed  - Antiplatelet therapy to continue for one year minimum  - follow up with cardiology 2 weeks post procedure   - post procedural care and instructions reviewed with the patient as well as questions answered.    - plan for discharge home when stable   - Diet instructions given for low cholesterol and Low sodium.    - continue Brilinta and baby asa indefinitely unknown

## 2020-05-05 ENCOUNTER — TRANSCRIPTION ENCOUNTER (OUTPATIENT)
Age: 55
End: 2020-05-05

## 2020-05-05 LAB
ALBUMIN SERPL ELPH-MCNC: 2.9 G/DL — LOW (ref 3.3–5.2)
ALP SERPL-CCNC: 74 U/L — SIGNIFICANT CHANGE UP (ref 40–120)
ALT FLD-CCNC: 32 U/L — SIGNIFICANT CHANGE UP
ANION GAP SERPL CALC-SCNC: 12 MMOL/L — SIGNIFICANT CHANGE UP (ref 5–17)
ANION GAP SERPL CALC-SCNC: 14 MMOL/L — SIGNIFICANT CHANGE UP (ref 5–17)
AST SERPL-CCNC: 43 U/L — HIGH
BASOPHILS # BLD AUTO: 0.03 K/UL — SIGNIFICANT CHANGE UP (ref 0–0.2)
BASOPHILS NFR BLD AUTO: 0.4 % — SIGNIFICANT CHANGE UP (ref 0–2)
BILIRUB SERPL-MCNC: <0.2 MG/DL — LOW (ref 0.4–2)
BUN SERPL-MCNC: 12 MG/DL — SIGNIFICANT CHANGE UP (ref 8–20)
BUN SERPL-MCNC: 15 MG/DL — SIGNIFICANT CHANGE UP (ref 8–20)
CALCIUM SERPL-MCNC: 7.2 MG/DL — LOW (ref 8.6–10.2)
CALCIUM SERPL-MCNC: 9.8 MG/DL — SIGNIFICANT CHANGE UP (ref 8.6–10.2)
CHLORIDE SERPL-SCNC: 100 MMOL/L — SIGNIFICANT CHANGE UP (ref 98–107)
CHLORIDE SERPL-SCNC: 106 MMOL/L — SIGNIFICANT CHANGE UP (ref 98–107)
CO2 SERPL-SCNC: 22 MMOL/L — SIGNIFICANT CHANGE UP (ref 22–29)
CO2 SERPL-SCNC: 24 MMOL/L — SIGNIFICANT CHANGE UP (ref 22–29)
CREAT SERPL-MCNC: 0.75 MG/DL — SIGNIFICANT CHANGE UP (ref 0.5–1.3)
CREAT SERPL-MCNC: 0.77 MG/DL — SIGNIFICANT CHANGE UP (ref 0.5–1.3)
EOSINOPHIL # BLD AUTO: 0.18 K/UL — SIGNIFICANT CHANGE UP (ref 0–0.5)
EOSINOPHIL NFR BLD AUTO: 2.6 % — SIGNIFICANT CHANGE UP (ref 0–6)
GLUCOSE SERPL-MCNC: 107 MG/DL — HIGH (ref 70–99)
GLUCOSE SERPL-MCNC: 70 MG/DL — SIGNIFICANT CHANGE UP (ref 70–99)
HCT VFR BLD CALC: 35.7 % — LOW (ref 39–50)
HGB BLD-MCNC: 11.7 G/DL — LOW (ref 13–17)
IMM GRANULOCYTES NFR BLD AUTO: 0.1 % — SIGNIFICANT CHANGE UP (ref 0–1.5)
LYMPHOCYTES # BLD AUTO: 1.69 K/UL — SIGNIFICANT CHANGE UP (ref 1–3.3)
LYMPHOCYTES # BLD AUTO: 24.6 % — SIGNIFICANT CHANGE UP (ref 13–44)
MCHC RBC-ENTMCNC: 28.4 PG — SIGNIFICANT CHANGE UP (ref 27–34)
MCHC RBC-ENTMCNC: 32.8 GM/DL — SIGNIFICANT CHANGE UP (ref 32–36)
MCV RBC AUTO: 86.7 FL — SIGNIFICANT CHANGE UP (ref 80–100)
MONOCYTES # BLD AUTO: 0.99 K/UL — HIGH (ref 0–0.9)
MONOCYTES NFR BLD AUTO: 14.4 % — HIGH (ref 2–14)
NEUTROPHILS # BLD AUTO: 3.98 K/UL — SIGNIFICANT CHANGE UP (ref 1.8–7.4)
NEUTROPHILS NFR BLD AUTO: 57.9 % — SIGNIFICANT CHANGE UP (ref 43–77)
PLATELET # BLD AUTO: 169 K/UL — SIGNIFICANT CHANGE UP (ref 150–400)
POTASSIUM SERPL-MCNC: 2.8 MMOL/L — CRITICAL LOW (ref 3.5–5.3)
POTASSIUM SERPL-MCNC: 4.3 MMOL/L — SIGNIFICANT CHANGE UP (ref 3.5–5.3)
POTASSIUM SERPL-SCNC: 2.8 MMOL/L — CRITICAL LOW (ref 3.5–5.3)
POTASSIUM SERPL-SCNC: 4.3 MMOL/L — SIGNIFICANT CHANGE UP (ref 3.5–5.3)
PROT SERPL-MCNC: 5.4 G/DL — LOW (ref 6.6–8.7)
RBC # BLD: 4.12 M/UL — LOW (ref 4.2–5.8)
RBC # FLD: 14.3 % — SIGNIFICANT CHANGE UP (ref 10.3–14.5)
SODIUM SERPL-SCNC: 138 MMOL/L — SIGNIFICANT CHANGE UP (ref 135–145)
SODIUM SERPL-SCNC: 140 MMOL/L — SIGNIFICANT CHANGE UP (ref 135–145)
WBC # BLD: 6.88 K/UL — SIGNIFICANT CHANGE UP (ref 3.8–10.5)
WBC # FLD AUTO: 6.88 K/UL — SIGNIFICANT CHANGE UP (ref 3.8–10.5)

## 2020-05-05 PROCEDURE — 99233 SBSQ HOSP IP/OBS HIGH 50: CPT

## 2020-05-05 PROCEDURE — 93010 ELECTROCARDIOGRAM REPORT: CPT

## 2020-05-05 RX ORDER — POTASSIUM CHLORIDE 20 MEQ
10 PACKET (EA) ORAL
Refills: 0 | Status: COMPLETED | OUTPATIENT
Start: 2020-05-05 | End: 2020-05-05

## 2020-05-05 RX ORDER — POTASSIUM CHLORIDE 20 MEQ
40 PACKET (EA) ORAL ONCE
Refills: 0 | Status: COMPLETED | OUTPATIENT
Start: 2020-05-05 | End: 2020-05-05

## 2020-05-05 RX ADMIN — LISINOPRIL 2.5 MILLIGRAM(S): 2.5 TABLET ORAL at 10:03

## 2020-05-05 RX ADMIN — TICAGRELOR 90 MILLIGRAM(S): 90 TABLET ORAL at 14:38

## 2020-05-05 RX ADMIN — Medication 25 MILLIGRAM(S): at 10:05

## 2020-05-05 RX ADMIN — Medication 40 MILLIEQUIVALENT(S): at 10:03

## 2020-05-05 RX ADMIN — Medication 100 MILLIEQUIVALENT(S): at 10:05

## 2020-05-05 RX ADMIN — Medication 81 MILLIGRAM(S): at 10:05

## 2020-05-05 RX ADMIN — Medication 100 MILLIEQUIVALENT(S): at 12:00

## 2020-05-05 RX ADMIN — Medication 100 MILLIEQUIVALENT(S): at 14:31

## 2020-05-05 RX ADMIN — Medication 25 MILLIGRAM(S): at 20:04

## 2020-05-05 RX ADMIN — ATORVASTATIN CALCIUM 40 MILLIGRAM(S): 80 TABLET, FILM COATED ORAL at 22:04

## 2020-05-05 RX ADMIN — TICAGRELOR 90 MILLIGRAM(S): 90 TABLET ORAL at 04:12

## 2020-05-05 NOTE — PROGRESS NOTE ADULT - ASSESSMENT
Chest pain likely NSTEMI  Elevated serial troponin  Recent COVID19 infection; Potential sequelae  Status post heart cath PCI and BRADY Prox LCx , Mid LCx ,Diagonal.      - CT-Angio Chest NO PE  - Cardiac cath in am 5/4 EF 55%.  Occlusions: Diagonal 95%, Prox Lcx 70%, Mid Lcx 70  Aspirin , statin , metoprolol.  Low dose ACE lisinopril     H/o Hyperlipidemia;  Lipitor 40mg po daily     Hypertension   Metoprolol , lisinopril     Hyperglycemia, r/o DM Type 2  - HbA1c 5.9  To control with diet     Disposition. Was retained for hypokalemia.  To replete and dc home in am

## 2020-05-05 NOTE — DISCHARGE NOTE PROVIDER - CARE PROVIDER_API CALL
Janes Edwards)  Cardiology; Cardiovascular Disease; Internal Medicine  [address] [place] [address]  Phone: [phone]  Fax: [phone]  Follow Up Time:

## 2020-05-05 NOTE — DISCHARGE NOTE PROVIDER - NSDCFUADDAPPT_GEN_ALL_CORE_FT
Follow up with Dr. Edwards in one to two weeks.    Restricted use with no heavy lifting of affected arm for 48 hours.  No submerging the arm in water for 48 hours.  You may start showering today.  Call your doctor for any bleeding, swelling, loss of sensation in the hand or fingers, or fingers turning blue.  If heavy bleeding or large lumps form, hold pressure at the spot and come to the Emergency Room.    Choose lean meats and poultry without skin and prepare them without added saturated and trans fat.  Eat fish at least twice a week. Recent research shows that eating oily fish containing omega-3 fatty acids (for example, salmon, trout and herring) may help lower your risk of death from coronary artery disease.  Select fat-free, 1 percent fat and low-fat dairy products.  Cut back on foods containing partially hydrogenated vegetable oils to reduce trans fat in your diet.   To lower cholesterol, reduce saturated fat to no more than 5 to 6 percent of total calories. For someone eating 2,000 calories a day, that’s about 13 grams of saturated fat.  Cut back on beverages and foods with added sugars.  Choose and prepare foods with little or no salt. To lower blood pressure, aim to eat no more than 2,400 milligrams of sodium per day. Reducing daily intake to 1,500 mg is desirable because it can lower blood pressure even further.  If you drink alcohol, drink in moderation. That means one drink per day if you’re a woman and two drinks  per day if you’re a man.  Follow the American Heart Association recommendations when you eat out, and keep an eye on your portion sizes.

## 2020-05-05 NOTE — DISCHARGE NOTE PROVIDER - NSDCACTIVITY_GEN_ALL_CORE
Walking - Outdoors allowed/Stairs allowed/Walking - Indoors allowed/No heavy lifting/straining/Driving allowed/Showering allowed

## 2020-05-05 NOTE — DISCHARGE NOTE PROVIDER - NSDCMRMEDTOKEN_GEN_ALL_CORE_FT
aspirin 81 mg oral delayed release tablet: 1 tab(s) orally once a day  atorvastatin 40 mg oral tablet: 1 tab(s) orally once a day (at bedtime)  lisinopril 2.5 mg oral tablet: 1 tab(s) orally once a day  metoprolol tartrate 25 mg oral tablet: 0.5 tab(s) orally 2 times a day   ticagrelor 90 mg oral tablet: 1 tab(s) orally every 12 hours

## 2020-05-05 NOTE — PROGRESS NOTE ADULT - ASSESSMENT
A/P: Patient is a 56 y/o male with a recent history of COVID19 infection admitted to the ED with "Chest Pressure" that had been present on and off for two days which acutely worsened day of admission with diaphoresis and lightheaded. Chest CT neg for PE.  He was admitted with a finding of elevated troponin levels +NSTEMI now s/p LHC/PCI with Dr. Arroyo via [facility] 5/4/20: Successful PCI of Prox Diag 1 with BRADY x 1 (Synergy 2.5x12mm), Successful PCI of Prox Lcx with BRADY x 1(Synergy 2.39v32ol), Successful PCI of Mid Lcx with BRADY x 1 (Synergy 2.5x16mm) Left ventricular function is normal.  LVEF=55%.    -Patient stable from a cardiac perspective  -Follow up with Dr. Edwards/Dina in one to two weeks  -Meds: Maintain ASA/brilinta/beta blocker/statin/acei  -Benefits of ASA/Brilinta discussed with patient verbal understanding  -Lifestyle mods/diet/meds/activtiy discussed with patient verbal understanding  -Hypokalemia--IV and PO supplementation by Dr. Milner  -Discussed with Dr. Milner

## 2020-05-05 NOTE — PROGRESS NOTE ADULT - REASON FOR ADMISSION
Chest Pressure; Light Handedness
Chest Pressure; Light Headedness

## 2020-05-05 NOTE — PROGRESS NOTE ADULT - SUBJECTIVE AND OBJECTIVE BOX
[facility] [place]                                                               Office: [address] [place] [address]                                                              Telephone: [phone]. Fax:[phone]                                                                             PROGRESS NOTE  Reason for follow up: NSTEMI  Overnight: No new events.   Update:  5/5: Due to the nature of this patient's COVID-19 isolation status, no bedside physical exam done to limit spread of infection.  Examination highlights were provided by chart review. Objective data were reviewed in detail. Tele NSR HR @ 60s. Pt denies chest pain, palpitations, SOB. Cath site clean/dry/intact. Pt hypokalemic this AM 2.8. Pt given Potassium 40meq PO             	  Vitals:  T(C): 36.6 (05-05-20 @ 15:42), Max: 37.2 (05-05-20 @ 00:23)  HR: 67 (05-05-20 @ 11:33) (52 - 80)  BP: 131/82 (05-05-20 @ 11:33) (94/88 - 143/85)  RR: 23 (05-05-20 @ 11:33) (15 - 25)  SpO2: 98% (05-05-20 @ 11:33) (93% - 98%)    I&O's Summary    04 May 2020 07:01  -  05 May 2020 07:00  --------------------------------------------------------  IN: 240 mL / OUT: 600 mL / NET: -360 mL    05 May 2020 07:01  -  05 May 2020 15:58  --------------------------------------------------------  IN: 300 mL / OUT: 450 mL / NET: -150 mL      Weight (kg): 67.692476933 (05-02 @ 10:45)      PHYSICAL EXAM:  Due to the nature of this patient's COVID-19 isolation status, no bedside physical exam done to limit spread of infection.  Examination highlights were provided by chart review. Objective data were reviewed in detail. Please refer to progress note by cath lab NP      CURRENT MEDICATIONS:  lisinopril 2.5 milliGRAM(s) Oral daily  metoprolol tartrate 25 milliGRAM(s) Oral two times a day  atorvastatin  aspirin enteric coated  ticagrelor      DIAGNOSTIC TESTING:  [ ] Echocardiogram: < from: TTE Echo Complete w/o contrast w/ Doppler (05.03.20 @ 14:05) >  Summary:   1. Left ventricular ejection fraction, by visual estimation, is 55 to 60%.   2. Normal global left ventricular systolic function.   3. Normal right ventricular size and function.   4. There is no evidence of pericardial effusion.   5. Mild thickening of the anterior mitral valve leaflet.   6. Sclerotic aortic valve with normal opening.   7. Mild to moderate pulmonic valve regurgitation.    < end of copied text >    [ ]  Catheterization:< from: Cardiac Cath Lab - Adult (05.04.20 @ 14:05) >  DIAGNOSTIC IMPRESSIONS: There is significant double vessel coronary artery  disease.  Prox Diag1=95%  Prox LCx=70%  Mid LCx=70%  Successful PCI of Prox Diag 1 with BRADY x 1 (Synergy 2.5x12mm)  Successful PCI of Prox Lcx with BRADY x 1(Synergy 2.50u80tg)  Successful PCI of Mid Lcx with BRADY x 1 (Synergy 2.5x16mm) Left ventricular  function is normal.  LVEF=55%    < end of copied text >        OTHER: 	  XRAY: < from: Xray Chest 1 View- PORTABLE-Urgent (05.02.20 @ 12:59) >  IMPRESSION: Clear lungs.    < end of copied text >      LABS:	 	  CARDIAC MARKERS ( 03 May 2020 08:56 )  x     / 3.80 ng/mL / x     / x     / x      p-BNP 03 May 2020 08:56: x    , CARDIAC MARKERS ( 02 May 2020 23:49 )  x     / 3.16 ng/mL / x     / x     / x      p-BNP 02 May 2020 23:49: x                              11.7   6.88  )-----------( 169      ( 05 May 2020 05:12 )             35.7     05-05    140  |  106  |  15.0  ----------------------------<  70  2.8<LL>   |  22.0  |  0.75    Ca    7.2<L>      05 May 2020 05:12  Mg     1.7     05-05    TPro  5.4<L>  /  Alb  2.9<L>  /  TBili  <0.2<L>  /  DBili  x   /  AST  43<H>  /  ALT  32  /  AlkPhos  74  05-05    Lipid Profile: Date: 05-03 @ 08:56  Total cholesterol 233; Direct LDL: 155; HDL: 41; Triglycerides:186       TSH: Thyroid Stimulating Hormone, Serum: 4.74 uIU/mL        TELEMETRY: NSR HR @ 60s

## 2020-05-05 NOTE — DISCHARGE NOTE PROVIDER - NSDCCPCAREPLAN_GEN_ALL_CORE_FT
PRINCIPAL DISCHARGE DIAGNOSIS  Diagnosis: NSTEMI (non-ST elevated myocardial infarction)  Assessment and Plan of Treatment:       SECONDARY DISCHARGE DIAGNOSES  Diagnosis: Educated about COVID-19 virus infection  Assessment and Plan of Treatment:

## 2020-05-05 NOTE — DISCHARGE NOTE PROVIDER - HOSPITAL COURSE
Cholesterol is under adequate control. Continue current management.   Chest pain likely NSTEMI    Elevated serial troponin    Recent COVID19 infection; Potential sequelae    Schedule for heart cath 5/4 by cardiology         - CT-Angio Chest NO PE    - 2-D ECHO to evaluate LV function/WMA    - Cardiology consultation: [facility] cardiology- will be NPO after midnight for Cardiac cath in am 5/4    Aspirin , statin , metoprolol    - Telemetry monitoring        H/o Hyperlipidemia;    Lipitor 40mg po daily         Hypertension     - Metoprolol 12.5 mg BID, Lisinopril 5 mg daily with holding parameters        Hyperglycemia, r/o DM Type 2    - HbA1c 5.9 Chest pain likely NSTEMI    Elevated serial troponin    Recent COVID19 infection; Potential sequelae    Status post heart cath PCI and BRADY Prox LCx , Mid LCx ,Diagonal.          - CT-Angio Chest NO PE    - Cardiac cath in am 5/4 EF 55%.  Occlusions: Diagonal 95%, Prox Lcx 70%, Mid Lcx 70    Aspirin , statin , metoprolol.  Low dose ACE lisinopril         H/o Hyperlipidemia;    Lipitor 40mg po daily         Hypertension     Metoprolol , lisinopril         Hyperglycemia, r/o DM Type 2    - HbA1c 5.9    To control with diet

## 2020-05-05 NOTE — PROGRESS NOTE ADULT - SUBJECTIVE AND OBJECTIVE BOX
Cardiology NP note:    -s/p LHC/PCI:  There is significant double vessel coronary artery disease.  Prox Diag1=95%  Prox LCx=70%  Mid LCx=70%  Successful PCI of Prox Diag 1 with BRADY x 1 (Synergy 2.5x12mm)  Successful PCI of Prox Lcx with BRADY x 1(Synergy 2.42m85op)  Successful PCI of Mid Lcx with BRADY x 1 (Synergy 2.5x16mm) Left ventricular  function is normal.  LVEF=55%    EKG:  TELE: NSR 60s    MEDICATIONS  (STANDING):  aspirin enteric coated 81 milliGRAM(s) Oral daily  atorvastatin 40 milliGRAM(s) Oral at bedtime  lisinopril 2.5 milliGRAM(s) Oral daily  metoprolol tartrate 25 milliGRAM(s) Oral two times a day  potassium chloride  10 mEq/100 mL IVPB 10 milliEquivalent(s) IV Intermittent every 1 hour  ticagrelor 90 milliGRAM(s) Oral every 12 hours    MEDICATIONS  (PRN):  fentaNYL    Injectable 25 MICROGram(s) IV Push every 15 minutes PRN Severe Pain (7 - 10)    Allergies:  No Known Allergies      PAST MEDICAL & SURGICAL HISTORY:  No pertinent past medical history: Hyperlipidemia  No significant past surgical history      Vital Signs Last 24 Hrs  T(C): 36.5 (05 May 2020 08:09), Max: 37.2 (05 May 2020 00:23)  T(F): 97.7 (05 May 2020 08:09), Max: 98.9 (05 May 2020 00:23)  HR: 66 (05 May 2020 08:00) (52 - 80)  BP: 125/76 (05 May 2020 08:00) (94/88 - 143/85)  BP(mean): 90 (05 May 2020 08:00) (76 - 90)  RR: 17 (05 May 2020 08:00) (15 - 25)  SpO2: 97% (05 May 2020 08:00) (93% - 98%)    Physical Exam:  Constitutional: NAD, AAOx3  Cardiovascular: +S1S2 RRR  Pulmonary: CTA b/l, unlabored  GI: soft NTND +BS  Extremities: no pedal edema, +distal pulses b/l  Neuro: non focal, BOWLES x4  Procedure site: Right radial site benign without hematoma/bleeding; RUE warm/mobile/acyanotic; + right radial pulse    LABS:                        11.7   6.88  )-----------( 169      ( 05 May 2020 05:12 )             35.7     05-05    140  |  106  |  15.0  ----------------------------<  70  2.8<LL>   |  22.0  |  0.75    Ca    7.2<L>      05 May 2020 05:12  Mg     1.7     05-05    TPro  5.4<L>  /  Alb  2.9<L>  /  TBili  <0.2<L>  /  DBili  x   /  AST  43<H>  /  ALT  32  /  AlkPhos  74  05-05    PT/INR - ( 04 May 2020 11:59 )   PT: 11.0 sec;   INR: 0.97 ratio         PTT - ( 04 May 2020 11:59 )  PTT:34.5 sec      RADIOLOGY & ADDITIONAL TESTS:  < from: Cardiac Cath Lab - Adult (20 @ 14:05) >  INDICATIONS: Initial NSTEMI.  HISTORY: 56 yo man with no prior history of heart disease. Presented with  CP and SOB. Cardiac enzymes were elevated and tested positive for  COVID-19. There was no prior cardiac history. There were no significant  risk factors.  PROCEDURE:  --  Left heart catheterization with ventriculography.  --  Right coronary angiography.  --  Left coronary angiography.  --  Intervention on D1: drug-eluting stent.  --  Intervention on mid circumflex: drug-eluting stent.  --  Intervention on proximal circumflex: drug-eluting stent.  TECHNIQUE: The risks and alternatives of the procedures and conscious  sedation were explained to the patient and informed consent was obtained.  Cardiac catheterization performed urgently. Coronary intervention  performed urgently.  Local anesthetic given. Right radial artery access. Left heart  catheterization. Ventriculography was performed. Right coronary artery  angiography. The vessel was injected utilizing a catheter. Left coronary  artery angiography. The vessel was injected utilizing a catheter.  RADIATION EXPOSURE: 14.4 min. A drug-eluting stent was performed on the 95  % lesion in the 1st diagonal. There was no dissection. Vessel setup was  performed. A 6F JL3.5 LAUNCHER guiding catheter was used to intubate the  vessel. Balloon angioplasty was performed, using a EMERGE MR 2.00 MM X  12MM balloon, with 2 inflations and a maximum inflation pressure of 12  yvonne. During the procedure, a new GUIDEWIRE [employer] UNIVERSAL 190CM wire was  advanced across the lesion. A SYNERGY 2.50MM X 12MM drug-eluting stent was  placed across the lesion and deployed at a maximum inflation pressure of  14 yvonne. A drug-eluting stent was performed on the lesion in the mid  circumflex. Following intervention there was a 2 % residual stenosis.  There was no dissection. A SYNERGY 2.50MM X 16MM drug-eluting stent was  placed across the lesion and deployed at a maximum inflation pressure of  14 yvonne. A drug-eluting stent was performed on the lesion in the proximal  circumflex. There was no dissection. A SYNERGY 2.75MM X 12MM drug-eluting  stent was placed across the lesion and deployed at a maximum inflation  pressure of 14 yvonne.  CONTRAST GIVEN: Omnipaque 70 ml. Omnipaque 37 ml. Omnipaque 37 ml.  MEDICATIONS GIVEN: Midazolam, 1 mg, IV. Fentanyl, 50 mcg, IV. Verapamil  (Isoptin, Calan, Covera), 5 mg, IA. Nitroglycerin, 200 mcg, intracoronary.  Nitroglycerin, 200 mcg, intracoronary. Nitroglycerin, 200 mcg,  intracoronary. Heparin, 2000 units, IV. Heparin, 5000 units, IV. 1%  Lidocaine, 10 ml, subcutaneously. 0.9NS, 500 ml, IV. ticagrelor, 180 mg,  PO.  VENTRICLES: There were no left ventricular global or regional wall motion  abnormalities. Analysis of regional contractile function demonstrated mild  anterolateral hypokinesis. EF calculated by contrast ventriculography was  55 %.  VALVES: AORTIC VALVE: The aortic valve was evaluated by left  ventriculography. The aortic valve appeared to be structurally normal. The  aortic valve leaflets exhibited normal thickness and normal excursion.  There was no aortic stenosis. MITRAL VALVE: The mitral valve was evaluated  by left ventriculography. The mitral valve appeared grossly normal. The  mitral leaflets exhibited normal thickness and normal excursion. The  mitral valve exhibited no regurgitation.  CORONARY VESSELS: The coronary circulation is right dominant.  LM:   --  LM: Normal. The vessel was normal sized, not calcified, and not  tortuous. Angiography showed no evidence of disease.  LAD:   --  LAD: The vessel was normal sized, not calcified, and not  tortuous. Angiography showed mild atherosclerosiswith no flow limiting  lesions.  --  D1: There was a tubular 95 % stenosis in the proximal third of the  vessel segment. The lesion was without evidence of thrombus. There was  CAITLIN grade 3 flow through the vessel (brisk flow). This lesion is a likely  culprit for the patient's recent myocardial infarction. It appears  amenable to percutaneous intervention.  CX:   --  Circumflex: The vessel was normal sized, not calcified, and  mildly tortuous. Angiography showed moderate atherosclerosis.  --  Proximal circumflex: The vessel was normal sized. There was a discrete  70 % stenosis in the proximal third of the vessel segment, just before  OM1. The lesion was without evidence of thrombus. There was CAITLIN grade 3  flow through the vessel (brisk flow).  --  Mid circumflex: There was a tubular 70 % stenosis in the middle third  of the vessel segment, just after OM1. The lesion was without evidence of  thrombus. There was CAITLIN grade 3 flow through the vessel (brisk flow). It  appears amenable to percutaneous intervention.  RCA:   --  RCA: The vessel was normal sized, not calcified, and not  tortuous. Angiography showed moderate atherosclerosis. There was a tubular  50 % stenosis in the middle third of the vessel segment, at the origin of  RV marginal 1. The lesion was without evidence of thrombus. There was CAITLIN  grade 3 flow through the vessel (brisk flow).  COMPLICATIONS: There were no complications. No complications occurred  during the cath lab visit. No complications occurred during the cath lab  visit.  DIAGNOSTIC IMPRESSIONS: There is significant double vessel coronary artery  disease.  Prox Diag1=95%  Prox LCx=70%  Mid LCx=70%  Successful PCI of Prox Diag 1 with BRADY x 1 (Synergy 2.5x12mm)  Successful PCI of Prox Lcx with BRADY x 1(Synergy 2.51p11df)  Successful PCI of Mid Lcx with BRADY x 1 (Synergy 2.5x16mm) Left ventricular  function is normal.  LVEF=55%  DIAGNOSTIC RECOMMENDATIONS: The patient should continue with the present  medications. Patient management should include aggressive medical therapy,  close monitoring of BUN and creatinine, and resumption of all previous  activities in 5 days. Medical management is recommended.  Prepared and signed by  Javier Arroyo MD  Signed 2020 15:48:05  HEMODYNAMIC TABLES  Pressures:  Baseline  Pressures:  - HR: 67  Pressures:  - Rhythm:  Pressures:  -- Aortic Pressure (S/D/M): 112/65/88  Pressures:  -- Left Ventricle (s/edp): 145/14/--  Outputs:  Baseline  Outputs:  -- CALCULATIONS[age]  Outputs:  -- CALCULATIONS: Body Surface Area: 1.75  Outputs:  -- CALCULATIONS: Height in cm: 165.00  Outputs:  -- CALCULATIONS: Sex: Male  Outputs:  -- CALCULATIONS: Weight in k.00  Outputs:  -- OUTPUTS: O2 consumption: 218.66  Outputs:  -- OUTPUTS: Vo2 Indexed: 125.00    < end of copied text > Cardiology NP note:    -s/p LHC/PCI:  There is significant double vessel coronary artery disease.  Prox Diag1=95%  Prox LCx=70%  Mid LCx=70%  Successful PCI of Prox Diag 1 with BRADY x 1 (Synergy 2.5x12mm)  Successful PCI of Prox Lcx with BRADY x 1(Synergy 2.35x18ev)  Successful PCI of Mid Lcx with BRADY x 1 (Synergy 2.5x16mm) Left ventricular  function is normal.  LVEF=55%    EKG this am: pending  TELE: NSR 60s    MEDICATIONS  (STANDING):  aspirin enteric coated 81 milliGRAM(s) Oral daily  atorvastatin 40 milliGRAM(s) Oral at bedtime  lisinopril 2.5 milliGRAM(s) Oral daily  metoprolol tartrate 25 milliGRAM(s) Oral two times a day  potassium chloride  10 mEq/100 mL IVPB 10 milliEquivalent(s) IV Intermittent every 1 hour  ticagrelor 90 milliGRAM(s) Oral every 12 hours    MEDICATIONS  (PRN):  fentaNYL    Injectable 25 MICROGram(s) IV Push every 15 minutes PRN Severe Pain (7 - 10)    Allergies:  No Known Allergies      PAST MEDICAL & SURGICAL HISTORY:  No pertinent past medical history: Hyperlipidemia  No significant past surgical history      Vital Signs Last 24 Hrs  T(C): 36.5 (05 May 2020 08:09), Max: 37.2 (05 May 2020 00:23)  T(F): 97.7 (05 May 2020 08:09), Max: 98.9 (05 May 2020 00:23)  HR: 66 (05 May 2020 08:00) (52 - 80)  BP: 125/76 (05 May 2020 08:00) (94/88 - 143/85)  BP(mean): 90 (05 May 2020 08:00) (76 - 90)  RR: 17 (05 May 2020 08:00) (15 - 25)  SpO2: 97% (05 May 2020 08:00) (93% - 98%)    Physical Exam:  Constitutional: NAD, AAOx3  Cardiovascular: +S1S2 RRR  Pulmonary: CTA b/l, unlabored  GI: soft NTND +BS  Extremities: no pedal edema, +distal pulses b/l  Neuro: non focal, BOWLES x4  Procedure site: Right radial site benign without hematoma/bleeding; RUE warm/mobile/acyanotic; + right radial pulse    LABS:                        11.7   6.88  )-----------( 169      ( 05 May 2020 05:12 )             35.7     05-05    140  |  106  |  15.0  ----------------------------<  70  2.8<LL>   |  22.0  |  0.75    Ca    7.2<L>      05 May 2020 05:12  Mg     1.7     05-05    TPro  5.4<L>  /  Alb  2.9<L>  /  TBili  <0.2<L>  /  DBili  x   /  AST  43<H>  /  ALT  32  /  AlkPhos  74  05-05    PT/INR - ( 04 May 2020 11:59 )   PT: 11.0 sec;   INR: 0.97 ratio         PTT - ( 04 May 2020 11:59 )  PTT:34.5 sec      RADIOLOGY & ADDITIONAL TESTS:  < from: Cardiac Cath Lab - Adult (20 @ 14:05) >  INDICATIONS: Initial NSTEMI.  HISTORY: 56 yo man with no prior history of heart disease. Presented with  CP and SOB. Cardiac enzymes were elevated and tested positive for  COVID-19. There was no prior cardiac history. There were no significant  risk factors.  PROCEDURE:  --  Left heart catheterization with ventriculography.  --  Right coronary angiography.  --  Left coronary angiography.  --  Intervention on D1: drug-eluting stent.  --  Intervention on mid circumflex: drug-eluting stent.  --  Intervention on proximal circumflex: drug-eluting stent.  TECHNIQUE: The risks and alternatives of the procedures and conscious  sedation were explained to the patient and informed consent was obtained.  Cardiac catheterization performed urgently. Coronary intervention  performed urgently.  Local anesthetic given. Right radial artery access. Left heart  catheterization. Ventriculography was performed. Right coronary artery  angiography. The vessel was injected utilizing a catheter. Left coronary  artery angiography. The vessel was injected utilizing a catheter.  RADIATION EXPOSURE: 14.4 min. A drug-eluting stent was performed on the 95  % lesion in the 1st diagonal. There was no dissection. Vessel setup was  performed. A 6F JL3.5 LAUNCHER guiding catheter was used to intubate the  vessel. Balloon angioplasty was performed, using a EMERGE MR 2.00 MM X  12MM balloon, with 2 inflations and a maximum inflation pressure of 12  yvonne. During the procedure, a new GUIDEWIRE [employer] UNIVERSAL 190CM wire was  advanced across the lesion. A SYNERGY 2.50MM X 12MM drug-eluting stent was  placed across the lesion and deployed at a maximum inflation pressure of  14 yvonne. A drug-eluting stent was performed on the lesion in the mid  circumflex. Following intervention there was a 2 % residual stenosis.  There was no dissection. A SYNERGY 2.50MM X 16MM drug-eluting stent was  placed across the lesion and deployed at a maximum inflation pressure of  14 yvonne. A drug-eluting stent was performed on the lesion in the proximal  circumflex. There was no dissection. A SYNERGY 2.75MM X 12MM drug-eluting  stent was placed across the lesion and deployed at a maximum inflation  pressure of 14 yvonne.  CONTRAST GIVEN: Omnipaque 70 ml. Omnipaque 37 ml. Omnipaque 37 ml.  MEDICATIONS GIVEN: Midazolam, 1 mg, IV. Fentanyl, 50 mcg, IV. Verapamil  (Isoptin, Calan, Covera), 5 mg, IA. Nitroglycerin, 200 mcg, intracoronary.  Nitroglycerin, 200 mcg, intracoronary. Nitroglycerin, 200 mcg,  intracoronary. Heparin, 2000 units, IV. Heparin, 5000 units, IV. 1%  Lidocaine, 10 ml, subcutaneously. 0.9NS, 500 ml, IV. ticagrelor, 180 mg,  PO.  VENTRICLES: There were no left ventricular global or regional wall motion  abnormalities. Analysis of regional contractile function demonstrated mild  anterolateral hypokinesis. EF calculated by contrast ventriculography was  55 %.  VALVES: AORTIC VALVE: The aortic valve was evaluated by left  ventriculography. The aortic valve appeared to be structurally normal. The  aortic valve leaflets exhibited normal thickness and normal excursion.  There was no aortic stenosis. MITRAL VALVE: The mitral valve was evaluated  by left ventriculography. The mitral valve appeared grossly normal. The  mitral leaflets exhibited normal thickness and normal excursion. The  mitral valve exhibited no regurgitation.  CORONARY VESSELS: The coronary circulation is right dominant.  LM:   --  LM: Normal. The vessel was normal sized, not calcified, and not  tortuous. Angiography showed no evidence of disease.  LAD:   --  LAD: The vessel was normal sized, not calcified, and not  tortuous. Angiography showed mild atherosclerosiswith no flow limiting  lesions.  --  D1: There was a tubular 95 % stenosis in the proximal third of the  vessel segment. The lesion was without evidence of thrombus. There was  CAITLIN grade 3 flow through the vessel (brisk flow). This lesion is a likely  culprit for the patient's recent myocardial infarction. It appears  amenable to percutaneous intervention.  CX:   --  Circumflex: The vessel was normal sized, not calcified, and  mildly tortuous. Angiography showed moderate atherosclerosis.  --  Proximal circumflex: The vessel was normal sized. There was a discrete  70 % stenosis in the proximal third of the vessel segment, just before  OM1. The lesion was without evidence of thrombus. There was CAITLIN grade 3  flow through the vessel (brisk flow).  --  Mid circumflex: There was a tubular 70 % stenosis in the middle third  of the vessel segment, just after OM1. The lesion was without evidence of  thrombus. There was CAITLIN grade 3 flow through the vessel (brisk flow). It  appears amenable to percutaneous intervention.  RCA:   --  RCA: The vessel was normal sized, not calcified, and not  tortuous. Angiography showed moderate atherosclerosis. There was a tubular  50 % stenosis in the middle third of the vessel segment, at the origin of  RV marginal 1. The lesion was without evidence of thrombus. There was CAITLIN  grade 3 flow through the vessel (brisk flow).  COMPLICATIONS: There were no complications. No complications occurred  during the cath lab visit. No complications occurred during the cath lab  visit.  DIAGNOSTIC IMPRESSIONS: There is significant double vessel coronary artery  disease.  Prox Diag1=95%  Prox LCx=70%  Mid LCx=70%  Successful PCI of Prox Diag 1 with BRADY x 1 (Synergy 2.5x12mm)  Successful PCI of Prox Lcx with BRADY x 1(Synergy 2.53u16zl)  Successful PCI of Mid Lcx with BRADY x 1 (Synergy 2.5x16mm) Left ventricular  function is normal.  LVEF=55%  DIAGNOSTIC RECOMMENDATIONS: The patient should continue with the present  medications. Patient management should include aggressive medical therapy,  close monitoring of BUN and creatinine, and resumption of all previous  activities in 5 days. Medical management is recommended.  Prepared and signed by  Javier Arroyo MD  Signed 2020 15:48:05  HEMODYNAMIC TABLES  Pressures:  Baseline  Pressures:  - HR: 67  Pressures:  - Rhythm:  Pressures:  -- Aortic Pressure (S/D/M): 112/65/88  Pressures:  -- Left Ventricle (s/edp): 145/14/--  Outputs:  Baseline  Outputs:  -- CALCULATIONS[age]  Outputs:  -- CALCULATIONS: Body Surface Area: 1.75  Outputs:  -- CALCULATIONS: Height in cm: 165.00  Outputs:  -- CALCULATIONS: Sex: Male  Outputs:  -- CALCULATIONS: Weight in k.00  Outputs:  -- OUTPUTS: O2 consumption: 218.66  Outputs:  -- OUTPUTS: Vo2 Indexed: 125.00    < end of copied text >

## 2020-05-05 NOTE — PROGRESS NOTE ADULT - SUBJECTIVE AND OBJECTIVE BOX
CC: NStemi status post PCI and 3 BRADY to Diagonal, pLcx, MLcx .  Hypokalemia 2.8  HPI:  HPI: Patient is a 56 y/o male with a recent history of COVID19 infection admitted to the ED with "Chest Pressure" that had been present on and off for two which acutely worsened today with diaphoresis and light headedness. He is to be admitted with a finding of elevated troponin levels and for further evaluation for possible ACS and to r/o PE.     He was seen and examined at bedside, A and O X 3 and currently expericing the same, if milder sensation of a poorly described pressure-like sensation located midsternally and wrapping around both sides of his chest. He states that he first noted this sensation about two weeks as he was recovering from COVID19 infection (verified positive). The pain/pressure was initially mild, intermittent in duration, occuring both at rest and with activity and without any associated relieving/exacerbating factors. He denies any associated dyspnea on exertion,cough, sputum production, palpitations, nausea, vomiting, dizziness/light headeness or any syncopal episode. He had contacted a Pulmonary physician in the middle of April and was prescribed a five day course of Prednisone and an inhaler which did not alleviate his symptoms. He was sent in for what appears to be a CT-Angio Chest, presumable to r/o PE on 2020 but he does not know the result as yet. Today, at around 10:00 AM, he was walking outside at a normal level of activity when the chest pressure return but of severe intensity and continued, this time associated with light headeness without any sycope. He again denies any associated dyspnea or palpitations. EMS were summoned and patient recieved a loading dose of ASA en route. He denies any recent prolonged travel, leg swelling or calf tenderness and denies any personal or family history of blood clots. No prior history of known CAD, but he states that he has not seen a doctor in some time. He states that he might have had elevated cholesterol some time back but is currently not on any medication. He notes that his father  of heart complications in his 30s but no other family history. He is a non-smoker.     Course in ED: On admission, patient was normotensive, afebrile and saturating at 99% on Rm Air. He experienced a recurrence of chest pain at around 1 PM in the ED that resolved spontaneously. Labs on admission were notable for: Initial negative troponin which increased to .24 on subsequent draw. Hyperglycemia (190). EKG does not evidence any ST-T changes suggestive of ischemia. CXR is negative for any acute process. CT-Angiogram Chest PE protocol was ordered in ED and patient recieved one dose of therapeutic Lovenox.     ROS: 10 point review of systems was performed and is negative except as described in HPI (02 May 2020 20:23)    REVIEW OF SYSTEMS:    Patient denied fever, chills, abdominal pain, nausea, vomiting, cough, shortness of breath, chest pain or palpitations    Vital Signs Last 24 Hrs  T(C): 36.7 (06 May 2020 11:37), Max: 37 (05 May 2020 19:48)  T(F): 98 (06 May 2020 11:37), Max: 98.6 (05 May 2020 19:48)  HR: 82 (06 May 2020 12:00) (58 - 82)  BP: 126/79 (06 May 2020 12:00) (104/69 - 131/78)  BP(mean): 93 (06 May 2020 12:00) (82 - 93)  RR: 21 (06 May 2020 12:00) (16 - 22)  SpO2: 99% (06 May 2020 12:00) (97% - 99%)I&O's Summary    05 May 2020 07:01  -  06 May 2020 07:00  --------------------------------------------------------  IN: 500 mL / OUT: 5 mL / NET: -1525 mL    06 May 2020 07:01  -  06 May 2020 12:40  --------------------------------------------------------  IN: 0 mL / OUT: 400 mL / NET: -400 mL      PHYSICAL EXAM:  GENERAL: NAD, well-groomed  HEENT: PERRL, +EOMI, anicteric, no [place]  NECK: Supple, No JVD   CHEST/LUNG: CTA bilaterally; Normal effort  HEART: S1S2 Normal intensity, no murmurs, gallops or rubs noted  ABDOMEN: Soft, BS Normoactive, NT, ND, no HSM noted  EXTREMITIES:  2+ radial and DP pulses noted, no clubbing, cyanosis, or edema noted, FROM x 4  SKIN: No rashes or lesions noted  NEURO: A&Ox3, no focal deficits noted, CN II-XII intact  PSYCH: normal mood and affect; insight/judgement appropriate  LABS:                        11.7   6.88  )-----------( 169      ( 05 May 2020 05:12 )             35.7     05-    138  |  100  |  12.0  ----------------------------<  107<H>  4.3   |  24.0  |  0.77    Ca    9.8      05 May 2020 17:44  Mg     1.7     05-05    TPro  5.4<L>  /  Alb  2.9<L>  /  TBili  <0.2<L>  /  DBili  x   /  AST  43<H>  /  ALT  32  /  AlkPhos  74  05-05        RADIOLOGY & ADDITIONAL TESTS:    MEDICATIONS:  MEDICATIONS  (STANDING):  aspirin enteric coated 81 milliGRAM(s) Oral daily  atorvastatin 40 milliGRAM(s) Oral at bedtime  lisinopril 2.5 milliGRAM(s) Oral daily  metoprolol tartrate 25 milliGRAM(s) Oral two times a day  ticagrelor 90 milliGRAM(s) Oral every 12 hours    MEDICATIONS  (PRN):

## 2020-05-05 NOTE — PROGRESS NOTE ADULT - ASSESSMENT
This is 54 y/o male with recent COVID-19 infection (dx 4/5/20), not on any home medications who presents with midsternal chest pain. Pt was outside walking when he suddenly felt chest pain radiating to b/l jaws, associated with nausea and diaphoresis. Pt received  mg en route. Pt states he has been experiencing some chest heaviness for the past couple of weeks which was attributed to COVID-19 and he was treated with steroids and inhalers but today his chest pain was different and much more severe.   In the ER pt was found to have elevated second troponin 0.24 (first set negative). EKG shows SR with no acute ST changes. Chest CTA negative for PE. Chest pain is now improved without any intervention. Vital signs stable.    5/5: Due to the nature of this patient's COVID-19 isolation status, no bedside physical exam done to limit spread of infection.  Examination highlights were provided by chart review. Objective data were reviewed in detail. Tele NSR HR @ 60s. Pt denies chest pain, palpitations, SOB. Cath site clean/dry/intact. Pt hypokalemic this AM 2.8. Pt given Potassium 40meq PO     NSTEMI  -Pt s/p C BRADY X3 (pDiag-90%, p.Lcx-70%, m.Lcx.-70%)    -Cont. DAPT, statin, ACE, BB  -Follow up with outpatient cardiology     Hypokalemia  -Potassium-2.8  -Managed by primary team with PO Potassium 40Meq

## 2020-05-06 ENCOUNTER — TRANSCRIPTION ENCOUNTER (OUTPATIENT)
Age: 55
End: 2020-05-06

## 2020-05-06 VITALS
SYSTOLIC BLOOD PRESSURE: 126 MMHG | HEART RATE: 82 BPM | OXYGEN SATURATION: 99 % | DIASTOLIC BLOOD PRESSURE: 79 MMHG | RESPIRATION RATE: 21 BRPM

## 2020-05-06 PROCEDURE — C1769: CPT

## 2020-05-06 PROCEDURE — 87635 SARS-COV-2 COVID-19 AMP PRB: CPT

## 2020-05-06 PROCEDURE — C9600: CPT | Mod: LD

## 2020-05-06 PROCEDURE — 83690 ASSAY OF LIPASE: CPT

## 2020-05-06 PROCEDURE — 99153 MOD SED SAME PHYS/QHP EA: CPT

## 2020-05-06 PROCEDURE — 84145 PROCALCITONIN (PCT): CPT

## 2020-05-06 PROCEDURE — 99239 HOSP IP/OBS DSCHRG MGMT >30: CPT

## 2020-05-06 PROCEDURE — 99152 MOD SED SAME PHYS/QHP 5/>YRS: CPT

## 2020-05-06 PROCEDURE — 85379 FIBRIN DEGRADATION QUANT: CPT

## 2020-05-06 PROCEDURE — 71275 CT ANGIOGRAPHY CHEST: CPT

## 2020-05-06 PROCEDURE — 82550 ASSAY OF CK (CPK): CPT

## 2020-05-06 PROCEDURE — 71045 X-RAY EXAM CHEST 1 VIEW: CPT

## 2020-05-06 PROCEDURE — 83036 HEMOGLOBIN GLYCOSYLATED A1C: CPT

## 2020-05-06 PROCEDURE — 93005 ELECTROCARDIOGRAM TRACING: CPT

## 2020-05-06 PROCEDURE — 80053 COMPREHEN METABOLIC PANEL: CPT

## 2020-05-06 PROCEDURE — C1874: CPT

## 2020-05-06 PROCEDURE — 85610 PROTHROMBIN TIME: CPT

## 2020-05-06 PROCEDURE — 84484 ASSAY OF TROPONIN QUANT: CPT

## 2020-05-06 PROCEDURE — 85730 THROMBOPLASTIN TIME PARTIAL: CPT

## 2020-05-06 PROCEDURE — 80048 BASIC METABOLIC PNL TOTAL CA: CPT

## 2020-05-06 PROCEDURE — 80061 LIPID PANEL: CPT

## 2020-05-06 PROCEDURE — C1725: CPT

## 2020-05-06 PROCEDURE — 84443 ASSAY THYROID STIM HORMONE: CPT

## 2020-05-06 PROCEDURE — C1894: CPT

## 2020-05-06 PROCEDURE — C9606: CPT | Mod: LD

## 2020-05-06 PROCEDURE — 82728 ASSAY OF FERRITIN: CPT

## 2020-05-06 PROCEDURE — 99285 EMERGENCY DEPT VISIT HI MDM: CPT

## 2020-05-06 PROCEDURE — 83735 ASSAY OF MAGNESIUM: CPT

## 2020-05-06 PROCEDURE — 93458 L HRT ARTERY/VENTRICLE ANGIO: CPT | Mod: 59

## 2020-05-06 PROCEDURE — 36415 COLL VENOUS BLD VENIPUNCTURE: CPT

## 2020-05-06 PROCEDURE — 82553 CREATINE MB FRACTION: CPT

## 2020-05-06 PROCEDURE — C1887: CPT

## 2020-05-06 PROCEDURE — 93306 TTE W/DOPPLER COMPLETE: CPT

## 2020-05-06 PROCEDURE — 85027 COMPLETE CBC AUTOMATED: CPT

## 2020-05-06 PROCEDURE — 86140 C-REACTIVE PROTEIN: CPT

## 2020-05-06 RX ORDER — ATORVASTATIN CALCIUM 80 MG/1
1 TABLET, FILM COATED ORAL
Qty: 30 | Refills: 0
Start: 2020-05-06 | End: 2020-06-04

## 2020-05-06 RX ORDER — LISINOPRIL 2.5 MG/1
1 TABLET ORAL
Qty: 30 | Refills: 0
Start: 2020-05-06 | End: 2020-06-04

## 2020-05-06 RX ORDER — METOPROLOL TARTRATE 50 MG
0.5 TABLET ORAL
Qty: 30 | Refills: 0
Start: 2020-05-06 | End: 2020-06-04

## 2020-05-06 RX ORDER — ASPIRIN/CALCIUM CARB/MAGNESIUM 324 MG
1 TABLET ORAL
Qty: 30 | Refills: 0
Start: 2020-05-06 | End: 2020-06-04

## 2020-05-06 RX ORDER — TICAGRELOR 90 MG/1
1 TABLET ORAL
Qty: 60 | Refills: 0
Start: 2020-05-06 | End: 2020-06-04

## 2020-05-06 RX ADMIN — LISINOPRIL 2.5 MILLIGRAM(S): 2.5 TABLET ORAL at 08:39

## 2020-05-06 RX ADMIN — TICAGRELOR 90 MILLIGRAM(S): 90 TABLET ORAL at 05:57

## 2020-05-06 RX ADMIN — Medication 81 MILLIGRAM(S): at 12:08

## 2020-05-06 NOTE — DISCHARGE NOTE NURSING/CASE MANAGEMENT/SOCIAL WORK - PATIENT PORTAL LINK FT
You can access the FollowMyHealth Patient Portal offered by [facility] by registering at the following website: [email or web address]. By joining [employer]’s FollowMyHealth portal, you will also be able to view your health information using other applications (apps) compatible with our system.

## 2020-05-13 PROBLEM — Z78.9 OTHER SPECIFIED HEALTH STATUS: Chronic | Status: ACTIVE | Noted: 2020-05-02

## 2020-05-19 DIAGNOSIS — Z86.39 PERSONAL HISTORY OF OTHER ENDOCRINE, NUTRITIONAL AND METABOLIC DISEASE: ICD-10-CM

## 2020-05-19 DIAGNOSIS — I21.4 NON-ST ELEVATION (NSTEMI) MYOCARDIAL INFARCTION: ICD-10-CM

## 2020-05-19 DIAGNOSIS — Z98.890 OTHER SPECIFIED POSTPROCEDURAL STATES: ICD-10-CM

## 2020-05-19 DIAGNOSIS — Z86.79 PERSONAL HISTORY OF OTHER DISEASES OF THE CIRCULATORY SYSTEM: ICD-10-CM

## 2020-05-19 DIAGNOSIS — R07.89 OTHER CHEST PAIN: ICD-10-CM

## 2020-05-19 DIAGNOSIS — R42 DIZZINESS AND GIDDINESS: ICD-10-CM

## 2020-05-20 ENCOUNTER — APPOINTMENT (OUTPATIENT)
Dept: CARDIOLOGY | Facility: CLINIC | Age: 55
End: 2020-05-20
Payer: COMMERCIAL

## 2020-05-20 DIAGNOSIS — U07.1 COVID-19: ICD-10-CM

## 2020-05-20 PROCEDURE — 99213 OFFICE O/P EST LOW 20 MIN: CPT | Mod: 95

## 2020-05-20 RX ORDER — FLUTICASONE FUROATE 100 UG/1
100 POWDER RESPIRATORY (INHALATION)
Qty: 30 | Refills: 0 | Status: DISCONTINUED | COMMUNITY
Start: 2020-04-27

## 2020-05-20 RX ORDER — PREDNISONE 10 MG/1
10 TABLET ORAL
Qty: 5 | Refills: 0 | Status: DISCONTINUED | COMMUNITY
Start: 2020-04-22

## 2020-05-20 NOTE — PLAN
[FreeTextEntry1] : Mr. LAMBERT EDWARDS is a 55 year male with known CAD. Had PCI to the LCx and Diag at the beginning of the month and has been asymptomatic since then. \par At the present time He is completely asymptomatic.\par I have recommended to continue the same medications.\par I also recommended lifestyle modification with weight loss and exercise.\par Routine follow up in 3 months\par

## 2020-05-21 ENCOUNTER — TRANSCRIPTION ENCOUNTER (OUTPATIENT)
Age: 55
End: 2020-05-21

## 2020-07-07 NOTE — HISTORY OF PRESENT ILLNESS
[Medical Office: ([facility])___] : at the medical office located in  [Home] : at home, [unfilled] , at the time of the visit. [Verbal consent obtained from patient] : the patient, [unfilled] [FreeTextEntry1] : Time Initiated 9:25 am [FreeTextEntry4] : HALEIGH Henderson

## 2020-07-07 NOTE — HISTORY OF PRESENT ILLNESS
[Medical Office: ([facility])___] : at the medical office located in  [Home] : at home, [unfilled] , at the time of the visit. [Verbal consent obtained from patient] : the patient, [unfilled] [FreeTextEntry1] : Time Initiated 9:25 am [FreeTextEntry4] : HALEIGH Henderson

## 2020-08-26 ENCOUNTER — NON-APPOINTMENT (OUTPATIENT)
Age: 55
End: 2020-08-26

## 2020-08-26 ENCOUNTER — APPOINTMENT (OUTPATIENT)
Dept: CARDIOLOGY | Facility: CLINIC | Age: 55
End: 2020-08-26
Payer: COMMERCIAL

## 2020-08-26 VITALS
OXYGEN SATURATION: 99 % | HEART RATE: 52 BPM | SYSTOLIC BLOOD PRESSURE: 124 MMHG | DIASTOLIC BLOOD PRESSURE: 72 MMHG | BODY MASS INDEX: 23.66 KG/M2 | HEIGHT: 65 IN | TEMPERATURE: 98.4 F | WEIGHT: 142 LBS

## 2020-08-26 PROCEDURE — 99214 OFFICE O/P EST MOD 30 MIN: CPT | Mod: 24

## 2020-08-26 PROCEDURE — 93000 ELECTROCARDIOGRAM COMPLETE: CPT

## 2020-08-26 NOTE — PHYSICAL EXAM
[General Appearance - Well Developed] : well developed [Well Groomed] : well groomed [Normal Appearance] : normal appearance [General Appearance - Well Nourished] : well nourished [No Deformities] : no deformities [Normal Conjunctiva] : the conjunctiva exhibited no abnormalities [Normal Jugular Venous V Waves Present] : normal jugular venous V waves present [Exaggerated Use Of Accessory Muscles For Inspiration] : no accessory muscle use [Respiration, Rhythm And Depth] : normal respiratory rhythm and effort [Auscultation Breath Sounds / Voice Sounds] : lungs were clear to auscultation bilaterally [Chest Palpation] : palpation of the chest revealed no abnormalities [Lungs Percussion] : the lungs were normal to percussion [Arterial Pulses Normal] : the arterial pulses were normal [Heart Sounds] : normal S1 and S2 [Heart Rate And Rhythm] : heart rate and rhythm were normal [Edema] : no peripheral edema present [Veins - Varicosity Changes] : no varicosital changes were noted in the lower extremities [Abdomen Soft] : soft [Bowel Sounds] : normal bowel sounds [Abdomen Tenderness] : non-tender [Abdomen Hernia] : no hernia was discovered [Abnormal Walk] : normal gait [Abdomen Mass (___ Cm)] : no abdominal mass palpated [Nail Clubbing] : no clubbing of the fingernails [Cyanosis, Localized] : no localized cyanosis [] : no rash [Skin Color & Pigmentation] : normal skin color and pigmentation [Skin Turgor] : normal skin turgor [Impaired Insight] : insight and judgment were intact [Oriented To Time, Place, And Person] : oriented to person, place, and time [No Anxiety] : not feeling anxious [FreeTextEntry1] : LSB systolic murmur 2/6

## 2020-08-26 NOTE — HISTORY OF PRESENT ILLNESS
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 Admitted to [facility] in May 2020 withy a NSTEMI and COVID. Underwent cardiac cath that revealed two vessel CAD. Underwent PCI of the Diag with BRADY x 1 and Prox and Mid LCX with BRADY x 2. LVEF=55%. \par At the present time patient is completely asymptomatic and denies CP, SOB, orthopnea or PND. Denies palpitations, dizziness or ankle swelling.\par Denies any risk factors.\par Never smoked\par Occasional alcohol\par Denies the use of illicit drugs\par Father  at age 38 of [place]\par

## 2020-08-26 NOTE — DISCUSSION/SUMMARY
[FreeTextEntry1] : Mr. LAMBERT EDWARDS is a 55 year male with known CAD. \par At the present time He is completely asymptomatic.\par I have recommended to continue the same medications.\par We will perform routine laboratory tests\par Routine follow up in 6 months\par

## 2020-08-26 NOTE — ASSESSMENT
[FreeTextEntry1] : ECG performed today at the office revealed a sinus bradycardia 50 x', with normal AQRS, MI, QRS and QTc.\par

## 2020-09-01 LAB
ALBUMIN SERPL ELPH-MCNC: 4.7 G/DL
ALP BLD-CCNC: 104 U/L
ALT SERPL-CCNC: 23 U/L
ANION GAP SERPL CALC-SCNC: 11 MMOL/L
AST SERPL-CCNC: 21 U/L
BASOPHILS # BLD AUTO: 0.03 K/UL
BASOPHILS NFR BLD AUTO: 0.5 %
BILIRUB DIRECT SERPL-MCNC: 0.1 MG/DL
BILIRUB INDIRECT SERPL-MCNC: 0.2 MG/DL
BILIRUB SERPL-MCNC: 0.3 MG/DL
BUN SERPL-MCNC: 22 MG/DL
CALCIUM SERPL-MCNC: 9.7 MG/DL
CHLORIDE SERPL-SCNC: 102 MMOL/L
CHOLEST SERPL-MCNC: 159 MG/DL
CHOLEST/HDLC SERPL: 3.2 RATIO
CK SERPL-CCNC: 180 U/L
CO2 SERPL-SCNC: 26 MMOL/L
CREAT SERPL-MCNC: 1 MG/DL
EOSINOPHIL # BLD AUTO: 0.13 K/UL
EOSINOPHIL NFR BLD AUTO: 2.1 %
ESTIMATED AVERAGE GLUCOSE: 111 MG/DL
GLUCOSE SERPL-MCNC: 99 MG/DL
HBA1C MFR BLD HPLC: 5.5 %
HCT VFR BLD CALC: 44 %
HDLC SERPL-MCNC: 49 MG/DL
HGB BLD-MCNC: 14.1 G/DL
IMM GRANULOCYTES NFR BLD AUTO: 0.2 %
LDLC SERPL CALC-MCNC: 92 MG/DL
LYMPHOCYTES # BLD AUTO: 1.77 K/UL
LYMPHOCYTES NFR BLD AUTO: 28.9 %
MAN DIFF?: NORMAL
MCHC RBC-ENTMCNC: 28.7 PG
MCHC RBC-ENTMCNC: 32 GM/DL
MCV RBC AUTO: 89.6 FL
MONOCYTES # BLD AUTO: 0.52 K/UL
MONOCYTES NFR BLD AUTO: 8.5 %
NEUTROPHILS # BLD AUTO: 3.66 K/UL
NEUTROPHILS NFR BLD AUTO: 59.8 %
PLATELET # BLD AUTO: 218 K/UL
POTASSIUM SERPL-SCNC: 4.5 MMOL/L
PROT SERPL-MCNC: 7.1 G/DL
RBC # BLD: 4.91 M/UL
RBC # FLD: 13.3 %
SODIUM SERPL-SCNC: 140 MMOL/L
TRIGL SERPL-MCNC: 89 MG/DL
TSH SERPL-ACNC: 1.41 UIU/ML
WBC # FLD AUTO: 6.12 K/UL

## 2021-03-03 ENCOUNTER — NON-APPOINTMENT (OUTPATIENT)
Age: 56
End: 2021-03-03

## 2021-03-03 ENCOUNTER — APPOINTMENT (OUTPATIENT)
Dept: CARDIOLOGY | Facility: CLINIC | Age: 56
End: 2021-03-03
Payer: COMMERCIAL

## 2021-03-03 VITALS
BODY MASS INDEX: 24.32 KG/M2 | HEART RATE: 58 BPM | DIASTOLIC BLOOD PRESSURE: 72 MMHG | OXYGEN SATURATION: 100 % | TEMPERATURE: 98.2 F | SYSTOLIC BLOOD PRESSURE: 124 MMHG | WEIGHT: 146 LBS | HEIGHT: 65 IN

## 2021-03-03 DIAGNOSIS — Z20.822 CONTACT WITH AND (SUSPECTED) EXPOSURE TO COVID-19: ICD-10-CM

## 2021-03-03 PROCEDURE — 99212 OFFICE O/P EST SF 10 MIN: CPT | Mod: 24

## 2021-03-03 PROCEDURE — 99072 ADDL SUPL MATRL&STAF TM PHE: CPT

## 2021-03-03 PROCEDURE — 93000 ELECTROCARDIOGRAM COMPLETE: CPT

## 2021-03-03 RX ORDER — FLUTICASONE FUROATE AND VILANTEROL TRIFENATATE 200; 25 UG/1; UG/1
200-25 POWDER RESPIRATORY (INHALATION)
Refills: 0 | Status: DISCONTINUED | COMMUNITY
Start: 2020-05-11 | End: 2021-03-03

## 2021-03-03 NOTE — HISTORY OF PRESENT ILLNESS
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 Admitted to [facility] in May 2020 withy a NSTEMI and COVID-19. Underwent cardiac cath that revealed two vessel CAD. Underwent PCI of the Diag with BRADY x 1 and Prox and Mid LCX with BRADY x 2. LVEF=55%. \par At the present time patient is completely asymptomatic and denies CP, SOB, orthopnea or PND. Denies palpitations, dizziness or ankle swelling.\par Denies any risk factors.\par Never smoked\par Occasional alcohol\par Denies the use of illicit drugs\par Father  at age 38 of [place]\par

## 2021-03-03 NOTE — ASSESSMENT
[FreeTextEntry1] : ECG performed today at the office revealed a sinus bradycardia 52 x', with normal AQRS, AK, QRS and QTc.\par

## 2021-03-03 NOTE — PHYSICAL EXAM
[General Appearance - Well Developed] : well developed [Normal Appearance] : normal appearance [Well Groomed] : well groomed [General Appearance - Well Nourished] : well nourished [No Deformities] : no deformities [Normal Conjunctiva] : the conjunctiva exhibited no abnormalities [Normal Jugular Venous V Waves Present] : normal jugular venous V waves present [Respiration, Rhythm And Depth] : normal respiratory rhythm and effort [Exaggerated Use Of Accessory Muscles For Inspiration] : no accessory muscle use [Auscultation Breath Sounds / Voice Sounds] : lungs were clear to auscultation bilaterally [Chest Palpation] : palpation of the chest revealed no abnormalities [Lungs Percussion] : the lungs were normal to percussion [Heart Rate And Rhythm] : heart rate and rhythm were normal [Heart Sounds] : normal S1 and S2 [Arterial Pulses Normal] : the arterial pulses were normal [Edema] : no peripheral edema present [Veins - Varicosity Changes] : no varicosital changes were noted in the lower extremities [Bowel Sounds] : normal bowel sounds [Abdomen Soft] : soft [Abdomen Tenderness] : non-tender [Abdomen Mass (___ Cm)] : no abdominal mass palpated [Abdomen Hernia] : no hernia was discovered [Abnormal Walk] : normal gait [Nail Clubbing] : no clubbing of the fingernails [Cyanosis, Localized] : no localized cyanosis [Skin Color & Pigmentation] : normal skin color and pigmentation [Skin Turgor] : normal skin turgor [] : no rash [Oriented To Time, Place, And Person] : oriented to person, place, and time [Impaired Insight] : insight and judgment were intact [No Anxiety] : not feeling anxious [FreeTextEntry1] : LSB systolic murmur 2/6

## 2021-03-03 NOTE — DISCUSSION/SUMMARY
[FreeTextEntry1] : Mr. LAMBERT EDWARDS is a 55 year male with known CAD. \par At the present time He is completely asymptomatic.\par I have recommended to continue the same medications and to stop Brilinta because of easy bruising and it will be one year since the PCI.\par We will perform routine laboratory tests\par Routine follow up in 6 months\par

## 2021-03-16 LAB
ALBUMIN SERPL ELPH-MCNC: 4.5 G/DL
ALP BLD-CCNC: 106 U/L
ALT SERPL-CCNC: 20 U/L
ANION GAP SERPL CALC-SCNC: 8 MMOL/L
AST SERPL-CCNC: 21 U/L
BASOPHILS # BLD AUTO: 0.03 K/UL
BASOPHILS NFR BLD AUTO: 0.5 %
BILIRUB DIRECT SERPL-MCNC: 0.1 MG/DL
BILIRUB INDIRECT SERPL-MCNC: 0.3 MG/DL
BILIRUB SERPL-MCNC: 0.4 MG/DL
BUN SERPL-MCNC: 23 MG/DL
CALCIUM SERPL-MCNC: 8.9 MG/DL
CHLORIDE SERPL-SCNC: 104 MMOL/L
CHOLEST SERPL-MCNC: 148 MG/DL
CK SERPL-CCNC: 163 U/L
CO2 SERPL-SCNC: 28 MMOL/L
CREAT SERPL-MCNC: 0.99 MG/DL
EOSINOPHIL # BLD AUTO: 0.07 K/UL
EOSINOPHIL NFR BLD AUTO: 1.2 %
ESTIMATED AVERAGE GLUCOSE: 117 MG/DL
GLUCOSE SERPL-MCNC: 94 MG/DL
HBA1C MFR BLD HPLC: 5.7 %
HCT VFR BLD CALC: 44.5 %
HDLC SERPL-MCNC: 46 MG/DL
HGB BLD-MCNC: 14.5 G/DL
IMM GRANULOCYTES NFR BLD AUTO: 0.2 %
LDLC SERPL CALC-MCNC: 90 MG/DL
LYMPHOCYTES # BLD AUTO: 1.59 K/UL
LYMPHOCYTES NFR BLD AUTO: 28.1 %
MAN DIFF?: NORMAL
MCHC RBC-ENTMCNC: 28.9 PG
MCHC RBC-ENTMCNC: 32.6 GM/DL
MCV RBC AUTO: 88.8 FL
MONOCYTES # BLD AUTO: 0.49 K/UL
MONOCYTES NFR BLD AUTO: 8.7 %
NEUTROPHILS # BLD AUTO: 3.46 K/UL
NEUTROPHILS NFR BLD AUTO: 61.3 %
NONHDLC SERPL-MCNC: 102 MG/DL
PLATELET # BLD AUTO: 233 K/UL
POTASSIUM SERPL-SCNC: 4.2 MMOL/L
PROT SERPL-MCNC: 7.2 G/DL
RBC # BLD: 5.01 M/UL
RBC # FLD: 13.1 %
SODIUM SERPL-SCNC: 140 MMOL/L
TRIGL SERPL-MCNC: 63 MG/DL
TSH SERPL-ACNC: 1.17 UIU/ML
WBC # FLD AUTO: 5.65 K/UL

## 2021-09-15 ENCOUNTER — APPOINTMENT (OUTPATIENT)
Dept: CARDIOLOGY | Facility: CLINIC | Age: 56
End: 2021-09-15

## 2021-09-22 ENCOUNTER — RX CHANGE (OUTPATIENT)
Age: 56
End: 2021-09-22

## 2021-11-03 ENCOUNTER — APPOINTMENT (OUTPATIENT)
Dept: CARDIOLOGY | Facility: CLINIC | Age: 56
End: 2021-11-03
Payer: COMMERCIAL

## 2021-11-03 ENCOUNTER — NON-APPOINTMENT (OUTPATIENT)
Age: 56
End: 2021-11-03

## 2021-11-03 VITALS
SYSTOLIC BLOOD PRESSURE: 134 MMHG | DIASTOLIC BLOOD PRESSURE: 77 MMHG | HEART RATE: 54 BPM | TEMPERATURE: 98.1 F | BODY MASS INDEX: 24.13 KG/M2 | OXYGEN SATURATION: 98 % | WEIGHT: 145 LBS

## 2021-11-03 PROCEDURE — 99213 OFFICE O/P EST LOW 20 MIN: CPT

## 2021-11-03 PROCEDURE — 93000 ELECTROCARDIOGRAM COMPLETE: CPT

## 2021-11-03 RX ORDER — TICAGRELOR 90 MG/1
90 TABLET ORAL TWICE DAILY
Qty: 180 | Refills: 3 | Status: DISCONTINUED | COMMUNITY
Start: 2020-05-19 | End: 2021-11-03

## 2021-11-03 NOTE — HISTORY OF PRESENT ILLNESS
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 Admitted to [facility] in May 2020 withy a NSTEMI and COVID-19. Underwent cardiac cath that revealed two vessel CAD. Underwent PCI of the Diag with BRADY x 1 and Prox and Mid LCX with BRADY x 2. LVEF=55%. \par Has been C/O effort related chest discomfort radiates to the jaw. Last 1-2 minutes, not associated with SOB, palpitations or diaphoresis. Not always effort related, usually after standing and starting to walk fast. \par Denies any risk factors.\par Never smoked\par Occasional alcohol\par Denies the use of illicit drugs\par Father  at age 38 of [place]\par

## 2021-11-03 NOTE — ASSESSMENT
[FreeTextEntry1] : ECG performed today at the office revealed a sinus bradycardia 52 x', with normal AQRS, MI, QRS and QTc.\par

## 2021-11-03 NOTE — DISCUSSION/SUMMARY
[FreeTextEntry1] : Mr. LAMBERT EDWARDS is a 56 year male with known CAD. Underwent two vessel intervention in the past. Has been C/O chest discomfort that radiates to the jaw, which is new, not always effort-related and last few seconds to minutes. \par Will request a nuclear stress test to assess for ischemia and echo to assess valvular and LV function.\par I have recommended to continue the same medications.\par We will perform routine laboratory tests\par Routine follow up in 6 months\par

## 2021-11-22 ENCOUNTER — APPOINTMENT (OUTPATIENT)
Dept: CARDIOLOGY | Facility: CLINIC | Age: 56
End: 2021-11-22
Payer: COMMERCIAL

## 2021-11-22 PROCEDURE — 93306 TTE W/DOPPLER COMPLETE: CPT

## 2021-11-22 PROCEDURE — 93015 CV STRESS TEST SUPVJ I&R: CPT

## 2021-11-22 PROCEDURE — A9500: CPT

## 2021-11-22 PROCEDURE — 78452 HT MUSCLE IMAGE SPECT MULT: CPT

## 2021-12-08 LAB
ALBUMIN SERPL ELPH-MCNC: 4.7 G/DL
ALP BLD-CCNC: 110 U/L
ALT SERPL-CCNC: 23 U/L
ANION GAP SERPL CALC-SCNC: 13 MMOL/L
AST SERPL-CCNC: 23 U/L
BASOPHILS # BLD AUTO: 0.05 K/UL
BASOPHILS NFR BLD AUTO: 0.7 %
BILIRUB DIRECT SERPL-MCNC: 0.1 MG/DL
BILIRUB INDIRECT SERPL-MCNC: 0.3 MG/DL
BILIRUB SERPL-MCNC: 0.4 MG/DL
BUN SERPL-MCNC: 19 MG/DL
CALCIUM SERPL-MCNC: 9.8 MG/DL
CHLORIDE SERPL-SCNC: 103 MMOL/L
CHOLEST SERPL-MCNC: 162 MG/DL
CK SERPL-CCNC: 176 U/L
CO2 SERPL-SCNC: 25 MMOL/L
CREAT SERPL-MCNC: 0.91 MG/DL
EOSINOPHIL # BLD AUTO: 0.24 K/UL
EOSINOPHIL NFR BLD AUTO: 3.2 %
ESTIMATED AVERAGE GLUCOSE: 117 MG/DL
GLUCOSE SERPL-MCNC: 99 MG/DL
HBA1C MFR BLD HPLC: 5.7 %
HCT VFR BLD CALC: 47.1 %
HDLC SERPL-MCNC: 43 MG/DL
HGB BLD-MCNC: 15 G/DL
IMM GRANULOCYTES NFR BLD AUTO: 0.4 %
LDLC SERPL CALC-MCNC: 93 MG/DL
LYMPHOCYTES # BLD AUTO: 2.29 K/UL
LYMPHOCYTES NFR BLD AUTO: 30.1 %
MAN DIFF?: NORMAL
MCHC RBC-ENTMCNC: 28.4 PG
MCHC RBC-ENTMCNC: 31.8 GM/DL
MCV RBC AUTO: 89.2 FL
MONOCYTES # BLD AUTO: 0.67 K/UL
MONOCYTES NFR BLD AUTO: 8.8 %
NEUTROPHILS # BLD AUTO: 4.32 K/UL
NEUTROPHILS NFR BLD AUTO: 56.8 %
NONHDLC SERPL-MCNC: 119 MG/DL
PLATELET # BLD AUTO: 244 K/UL
POTASSIUM SERPL-SCNC: 4.5 MMOL/L
PROT SERPL-MCNC: 7.7 G/DL
RBC # BLD: 5.28 M/UL
RBC # FLD: 13.5 %
SODIUM SERPL-SCNC: 142 MMOL/L
TRIGL SERPL-MCNC: 129 MG/DL
TSH SERPL-ACNC: 1.48 UIU/ML
WBC # FLD AUTO: 7.6 K/UL

## 2021-12-09 ENCOUNTER — TRANSCRIPTION ENCOUNTER (OUTPATIENT)
Age: 56
End: 2021-12-09

## 2022-01-14 ENCOUNTER — TRANSCRIPTION ENCOUNTER (OUTPATIENT)
Age: 57
End: 2022-01-14

## 2022-02-14 ENCOUNTER — RX RENEWAL (OUTPATIENT)
Age: 57
End: 2022-02-14

## 2022-04-06 NOTE — CONSULT NOTE ADULT - ASSESSMENT
This is 54 y/o male with recent COVID-19 infection (dx 4/5/20), not on any home medications who presents with midsternal chest pain. Pt was outside walking when he suddenly felt chest pain radiating to b/l jaws, associated with nausea and diaphoresis. Pt received  mg en route. Pt states he has been experiencing some chest heaviness for the past couple of weeks which was attributed to COVID-19 and he was treated with steroids and inhalers but today his chest pain was different and much more severe.   In the ER pt was found to have elevated second troponin 0.24 (first set negative). EKG shows SR with no acute ST changes. Chest CTA negative for PE. Chest pain is now improved without any intervention. Vital signs stable.
no

## 2022-05-04 ENCOUNTER — NON-APPOINTMENT (OUTPATIENT)
Age: 57
End: 2022-05-04

## 2022-05-04 ENCOUNTER — APPOINTMENT (OUTPATIENT)
Dept: CARDIOLOGY | Facility: CLINIC | Age: 57
End: 2022-05-04
Payer: COMMERCIAL

## 2022-05-04 VITALS
BODY MASS INDEX: 24.99 KG/M2 | SYSTOLIC BLOOD PRESSURE: 129 MMHG | TEMPERATURE: 97.9 F | HEIGHT: 65 IN | WEIGHT: 150 LBS | DIASTOLIC BLOOD PRESSURE: 75 MMHG | OXYGEN SATURATION: 98 % | HEART RATE: 60 BPM

## 2022-05-04 PROCEDURE — 93000 ELECTROCARDIOGRAM COMPLETE: CPT

## 2022-05-04 PROCEDURE — 99213 OFFICE O/P EST LOW 20 MIN: CPT

## 2022-05-04 NOTE — HISTORY OF PRESENT ILLNESS
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 Admitted to [facility] in May 2020 withy a NSTEMI and COVID-19. Underwent cardiac cath that revealed two vessel CAD. Underwent PCI of the Diag with BRADY x 1 and Prox and Mid LCX with BRADY x 2. LVEF=55%. \par Echo done 2021 revealed normal LV and valvular function. \par Stress test done in 2021 15 METS with normal LVEF=59% and medium size moderate defect in the inferior wall that was partially reversible suggestive of infarction. \par At the present time patient is completely asymptomatic and denies CP, SOB, orthopnea or PND. Denies palpitations, dizziness or ankle swelling.\par Denies any risk factors.\par Never smoked\par Occasional alcohol\par Denies the use of illicit drugs\par Father  at age 38 of [place]\par Received the COVID 19 vaccine. No booster. Had in Dec [address]871 COVID again \par

## 2022-05-04 NOTE — ASSESSMENT
[FreeTextEntry1] : ECG performed today at the office revealed a sinus bradycardia 52 x', with normal AQRS, UT, QRS and QTc. No change. \par

## 2022-05-04 NOTE — DISCUSSION/SUMMARY
[FreeTextEntry1] : Mr. LAMBERT EDWARDS is a 57 year male with known CAD. Underwent two vessel intervention in the past. Has been asymptomatic. Echo revealed normal LV function and stress test revealed a medium size area of infarction in the inferior wall (?) with mild miladys-infarction ischemia. . \par I have recommended to continue the same medications.\par Will stop lisinopril (low dose and normal LVEF). \par Routine follow up in 6 months\par

## 2022-05-11 ENCOUNTER — RX RENEWAL (OUTPATIENT)
Age: 57
End: 2022-05-11

## 2022-10-19 ENCOUNTER — RX RENEWAL (OUTPATIENT)
Age: 57
End: 2022-10-19

## 2022-11-02 ENCOUNTER — NON-APPOINTMENT (OUTPATIENT)
Age: 57
End: 2022-11-02

## 2022-11-02 ENCOUNTER — APPOINTMENT (OUTPATIENT)
Dept: CARDIOLOGY | Facility: CLINIC | Age: 57
End: 2022-11-02

## 2022-11-02 VITALS
OXYGEN SATURATION: 99 % | DIASTOLIC BLOOD PRESSURE: 86 MMHG | WEIGHT: 149 LBS | HEIGHT: 65 IN | BODY MASS INDEX: 24.83 KG/M2 | TEMPERATURE: 97.9 F | HEART RATE: 57 BPM | SYSTOLIC BLOOD PRESSURE: 132 MMHG

## 2022-11-02 PROCEDURE — 99212 OFFICE O/P EST SF 10 MIN: CPT | Mod: 25

## 2022-11-02 PROCEDURE — 93000 ELECTROCARDIOGRAM COMPLETE: CPT

## 2022-11-02 RX ORDER — METOPROLOL TARTRATE 25 MG/1
25 TABLET, FILM COATED ORAL TWICE DAILY
Qty: 90 | Refills: 3 | Status: DISCONTINUED | COMMUNITY
Start: 2020-05-19 | End: 2022-11-02

## 2022-11-02 RX ORDER — ASPIRIN ENTERIC COATED TABLETS 81 MG 81 MG/1
81 TABLET, DELAYED RELEASE ORAL DAILY
Qty: 90 | Refills: 3 | Status: DISCONTINUED | COMMUNITY
Start: 2020-05-19 | End: 2022-11-02

## 2022-11-02 NOTE — DISCUSSION/SUMMARY
[FreeTextEntry1] : Mr. LAMBERT EDWARDS is a 57 year male with known CAD. Underwent two vessel intervention in the past. Has been asymptomatic. Echo revealed normal LV function and stress test revealed a medium size area of infarction in the inferior wall (?) with mild miladys-infarction ischemia.\par At the present time he is completely asymptomatic. \par I have recommended to continue the same medications.\par Will perform routine blood work. \par Routine follow up in 6 months\par

## 2022-11-02 NOTE — ASSESSMENT
[FreeTextEntry1] : ECG performed today at the office revealed NSR, with normal AQRS, OK, QRS and QTc. No change. \par

## 2022-11-02 NOTE — HISTORY OF PRESENT ILLNESS
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 Admitted to [facility] in May 2020 withy a NSTEMI and COVID-19. Underwent cardiac cath that revealed two vessel CAD. Underwent PCI of the Diag with BRADY x 1 and Prox and Mid LCX with BRADY x 2. LVEF=55%. \par Echo done 2021 revealed normal LV and valvular function. \par Stress test done in 2021 15 METS with normal LVEF=59% and medium size moderate defect in the inferior wall that was partially reversible suggestive of infarction. \par At the present time patient is completely asymptomatic and denies CP, SOB, orthopnea or PND. Denies palpitations, dizziness or ankle swelling.\par Denies any risk factors.\par Never smoked\par Occasional alcohol\par Denies the use of illicit drugs\par Father  at age 38 of [place]\par Received the COVID 19 vaccine. No booster. Had in Dec 59812 COVID again \par

## 2022-12-07 LAB
ALBUMIN SERPL ELPH-MCNC: 4.3 G/DL
ALP BLD-CCNC: 108 U/L
ALT SERPL-CCNC: 23 U/L
ANION GAP SERPL CALC-SCNC: 11 MMOL/L
AST SERPL-CCNC: 18 U/L
BASOPHILS # BLD AUTO: 0.05 K/UL
BASOPHILS NFR BLD AUTO: 0.8 %
BILIRUB DIRECT SERPL-MCNC: 0.1 MG/DL
BILIRUB INDIRECT SERPL-MCNC: 0.2 MG/DL
BILIRUB SERPL-MCNC: 0.4 MG/DL
BUN SERPL-MCNC: 18 MG/DL
CALCIUM SERPL-MCNC: 9.6 MG/DL
CHLORIDE SERPL-SCNC: 102 MMOL/L
CHOLEST SERPL-MCNC: 174 MG/DL
CK SERPL-CCNC: 168 U/L
CO2 SERPL-SCNC: 27 MMOL/L
CREAT SERPL-MCNC: 0.96 MG/DL
EGFR: 93 ML/MIN/1.73M2
EOSINOPHIL # BLD AUTO: 0.13 K/UL
EOSINOPHIL NFR BLD AUTO: 2 %
ESTIMATED AVERAGE GLUCOSE: 120 MG/DL
GLUCOSE SERPL-MCNC: 96 MG/DL
HBA1C MFR BLD HPLC: 5.8 %
HCT VFR BLD CALC: 47.1 %
HDLC SERPL-MCNC: 44 MG/DL
HGB BLD-MCNC: 15 G/DL
IMM GRANULOCYTES NFR BLD AUTO: 0.3 %
LDLC SERPL CALC-MCNC: 94 MG/DL
LYMPHOCYTES # BLD AUTO: 1.89 K/UL
LYMPHOCYTES NFR BLD AUTO: 28.5 %
MAN DIFF?: NORMAL
MCHC RBC-ENTMCNC: 28.1 PG
MCHC RBC-ENTMCNC: 31.8 GM/DL
MCV RBC AUTO: 88.2 FL
MONOCYTES # BLD AUTO: 0.62 K/UL
MONOCYTES NFR BLD AUTO: 9.3 %
NEUTROPHILS # BLD AUTO: 3.93 K/UL
NEUTROPHILS NFR BLD AUTO: 59.1 %
NONHDLC SERPL-MCNC: 130 MG/DL
PLATELET # BLD AUTO: 239 K/UL
POTASSIUM SERPL-SCNC: 4.5 MMOL/L
PROT SERPL-MCNC: 7.5 G/DL
RBC # BLD: 5.34 M/UL
RBC # FLD: 13.5 %
SODIUM SERPL-SCNC: 141 MMOL/L
TRIGL SERPL-MCNC: 176 MG/DL
TSH SERPL-ACNC: 1.65 UIU/ML
WBC # FLD AUTO: 6.64 K/UL

## 2023-04-10 ENCOUNTER — NON-APPOINTMENT (OUTPATIENT)
Age: 58
End: 2023-04-10

## 2023-05-17 ENCOUNTER — APPOINTMENT (OUTPATIENT)
Dept: CARDIOLOGY | Facility: CLINIC | Age: 58
End: 2023-05-17
Payer: COMMERCIAL

## 2023-05-17 ENCOUNTER — NON-APPOINTMENT (OUTPATIENT)
Age: 58
End: 2023-05-17

## 2023-05-17 VITALS
HEART RATE: 62 BPM | WEIGHT: 150 LBS | DIASTOLIC BLOOD PRESSURE: 70 MMHG | TEMPERATURE: 98 F | BODY MASS INDEX: 24.99 KG/M2 | SYSTOLIC BLOOD PRESSURE: 118 MMHG | RESPIRATION RATE: 16 BRPM | HEIGHT: 65 IN | OXYGEN SATURATION: 98 %

## 2023-05-17 PROCEDURE — 99215 OFFICE O/P EST HI 40 MIN: CPT | Mod: 25

## 2023-05-17 PROCEDURE — 93000 ELECTROCARDIOGRAM COMPLETE: CPT

## 2023-05-17 RX ORDER — LISINOPRIL 2.5 MG/1
2.5 TABLET ORAL DAILY
Qty: 90 | Refills: 3 | Status: DISCONTINUED | COMMUNITY
Start: 2020-05-19 | End: 2023-05-17

## 2023-10-26 ENCOUNTER — RX RENEWAL (OUTPATIENT)
Age: 58
End: 2023-10-26

## 2023-10-26 RX ORDER — ASPIRIN 81 MG/1
81 TABLET, COATED ORAL
Qty: 90 | Refills: 3 | Status: ACTIVE | COMMUNITY
Start: 2022-05-11 | End: 1900-01-01

## 2023-10-26 RX ORDER — METOPROLOL TARTRATE 25 MG/1
25 TABLET, FILM COATED ORAL TWICE DAILY
Qty: 90 | Refills: 3 | Status: ACTIVE | COMMUNITY
Start: 2023-10-26 | End: 1900-01-01

## 2023-12-20 ENCOUNTER — NON-APPOINTMENT (OUTPATIENT)
Age: 58
End: 2023-12-20

## 2023-12-20 ENCOUNTER — APPOINTMENT (OUTPATIENT)
Dept: CARDIOLOGY | Facility: CLINIC | Age: 58
End: 2023-12-20
Payer: COMMERCIAL

## 2023-12-20 VITALS
SYSTOLIC BLOOD PRESSURE: 135 MMHG | HEART RATE: 57 BPM | DIASTOLIC BLOOD PRESSURE: 79 MMHG | OXYGEN SATURATION: 98 % | WEIGHT: 151 LBS | BODY MASS INDEX: 25.16 KG/M2 | TEMPERATURE: 98.4 F | HEIGHT: 65 IN

## 2023-12-20 DIAGNOSIS — E78.5 HYPERLIPIDEMIA, UNSPECIFIED: ICD-10-CM

## 2023-12-20 DIAGNOSIS — I25.10 ATHEROSCLEROTIC HEART DISEASE OF NATIVE CORONARY ARTERY W/OUT ANGINA PECTORIS: ICD-10-CM

## 2023-12-20 DIAGNOSIS — R73.03 PREDIABETES.: ICD-10-CM

## 2023-12-20 PROCEDURE — 99213 OFFICE O/P EST LOW 20 MIN: CPT

## 2023-12-20 NOTE — HISTORY OF PRESENT ILLNESS
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 Admitted to [facility] in May 2020 with a NSTEMI and COVID-19. Underwent cardiac cath that revealed two vessel CAD. Underwent PCI of the Diag with BRADY x 1 and Prox and Mid LCX with BRADY x 2. LVEF=55%.  Echo done 2021 revealed normal LV and valvular function.  Stress test done in 2021 15 METS with normal LVEF=59% and medium size moderate defect in the inferior wall that was partially reversible suggestive of infarction.  At the present time patient is completely asymptomatic and denies CP, SOB, orthopnea or PND. Denies palpitations, dizziness or ankle swelling. Denies any risk factors. Never smoked Occasional alcohol Denies the use of illicit drugs Father  at age 38 of [place] Received the COVID 19 vaccine. No booster. Had in Dec 74082 COVID again

## 2023-12-20 NOTE — ASSESSMENT
[FreeTextEntry1] : EKG performed today at the office revealed a NSR, with normal AQRS, ID, QRS and QTc.

## 2023-12-20 NOTE — DISCUSSION/SUMMARY
[FreeTextEntry1] : Mr. LAMBERT EDWARDS is a 57 year male with known CAD. Underwent two vessel intervention in the past. At the present time he is completely asymptomatic.  I have recommended to continue the same medications. Will perform routine blood work.  Routine follow up in 6 months

## 2024-01-30 ENCOUNTER — RX RENEWAL (OUTPATIENT)
Age: 59
End: 2024-01-30

## 2024-01-30 RX ORDER — ATORVASTATIN CALCIUM 40 MG/1
40 TABLET, FILM COATED ORAL
Qty: 90 | Refills: 2 | Status: ACTIVE | COMMUNITY
Start: 2020-05-19 | End: 1900-01-01

## 2024-03-27 LAB
ALBUMIN SERPL ELPH-MCNC: 4.6 G/DL
ALP BLD-CCNC: 103 U/L
ALT SERPL-CCNC: 27 U/L
ANION GAP SERPL CALC-SCNC: 9 MMOL/L
APO LP(A) SERPL-MCNC: 55 NMOL/L
AST SERPL-CCNC: 23 U/L
BILIRUB DIRECT SERPL-MCNC: 0.2 MG/DL
BILIRUB INDIRECT SERPL-MCNC: 0.4 MG/DL
BILIRUB SERPL-MCNC: 0.5 MG/DL
BUN SERPL-MCNC: 24 MG/DL
CALCIUM SERPL-MCNC: 9.8 MG/DL
CHLORIDE SERPL-SCNC: 102 MMOL/L
CHOLEST SERPL-MCNC: 193 MG/DL
CK SERPL-CCNC: 165 U/L
CO2 SERPL-SCNC: 27 MMOL/L
CREAT SERPL-MCNC: 1.04 MG/DL
EGFR: 84 ML/MIN/1.73M2
ESTIMATED AVERAGE GLUCOSE: 117 MG/DL
GLUCOSE SERPL-MCNC: 99 MG/DL
HBA1C MFR BLD HPLC: 5.7 %
HDLC SERPL-MCNC: 53 MG/DL
LDLC SERPL CALC-MCNC: 124 MG/DL
NONHDLC SERPL-MCNC: 140 MG/DL
POTASSIUM SERPL-SCNC: 4.7 MMOL/L
PROT SERPL-MCNC: 7.8 G/DL
SODIUM SERPL-SCNC: 139 MMOL/L
TRIGL SERPL-MCNC: 91 MG/DL
TSH SERPL-ACNC: 1.57 UIU/ML

## 2024-07-31 ENCOUNTER — NON-APPOINTMENT (OUTPATIENT)
Age: 59
End: 2024-07-31

## 2024-07-31 ENCOUNTER — APPOINTMENT (OUTPATIENT)
Dept: CARDIOLOGY | Facility: CLINIC | Age: 59
End: 2024-07-31
Payer: COMMERCIAL

## 2024-07-31 VITALS
HEART RATE: 57 BPM | SYSTOLIC BLOOD PRESSURE: 146 MMHG | WEIGHT: 150 LBS | DIASTOLIC BLOOD PRESSURE: 78 MMHG | BODY MASS INDEX: 24.99 KG/M2 | HEIGHT: 65 IN | OXYGEN SATURATION: 96 %

## 2024-07-31 DIAGNOSIS — R73.03 PREDIABETES.: ICD-10-CM

## 2024-07-31 DIAGNOSIS — E78.5 HYPERLIPIDEMIA, UNSPECIFIED: ICD-10-CM

## 2024-07-31 DIAGNOSIS — I25.10 ATHEROSCLEROTIC HEART DISEASE OF NATIVE CORONARY ARTERY W/OUT ANGINA PECTORIS: ICD-10-CM

## 2024-07-31 PROCEDURE — 99214 OFFICE O/P EST MOD 30 MIN: CPT | Mod: 25

## 2024-07-31 PROCEDURE — 93000 ELECTROCARDIOGRAM COMPLETE: CPT

## 2024-07-31 NOTE — ASSESSMENT
[FreeTextEntry1] : EKG performed today at the office revealed a NSR, with normal AQRS, [place], QRS and QTc.

## 2024-07-31 NOTE — HISTORY OF PRESENT ILLNESS
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 Admitted to [facility] in May 2020 with a NSTEMI and COVID-19. Underwent cardiac cath that revealed two vessel CAD. Underwent PCI of the Diag with BRADY x 1 and Prox and Mid LCX with BRADY x 2. LVEF=55% in 2020.  Echo done 2021 revealed normal LV and valvular function.  Stress test done in 2021 15 METS with normal LVEF=59% and medium size moderate defect in the inferior wall that was partially reversible suggestive of infarction.  At the present time patient is completely asymptomatic and denies CP, SOB, orthopnea or PND. Denies palpitations, dizziness or ankle swelling. Hyperlipidemia treated with atorvastatin. Last VZZ=160 mg/dL in 2024 Prediabetes with an HqH1f=4.7% Never smoked Occasional alcohol Denies the use of illicit drugs Father  at age 38 of [place] Received the COVID 19 vaccine. No booster. Had in Dec 333[address] COVID again

## 2024-07-31 NOTE — DISCUSSION/SUMMARY
[FreeTextEntry1] : Mr. LAMBERT EDWARDS is a 59 year male with known CAD. Underwent two vessel intervention in the 5/2020.  At the present time he is completely asymptomatic.  I have recommended to continue the same medications. Will perform routine blood work.  Routine follow up in 6 months  [EKG obtained to assist in diagnosis and management of assessed problem(s)] : EKG obtained to assist in diagnosis and management of assessed problem(s)

## 2024-08-02 LAB
ALBUMIN SERPL ELPH-MCNC: 4.5 G/DL
ALP BLD-CCNC: 118 U/L
ALT SERPL-CCNC: 27 U/L
ANION GAP SERPL CALC-SCNC: 13 MMOL/L
APO LP(A) SERPL-MCNC: 40.5 NMOL/L
AST SERPL-CCNC: 26 U/L
BILIRUB DIRECT SERPL-MCNC: 0.1 MG/DL
BILIRUB INDIRECT SERPL-MCNC: 0.3 MG/DL
BILIRUB SERPL-MCNC: 0.4 MG/DL
BUN SERPL-MCNC: 14 MG/DL
CALCIUM SERPL-MCNC: 9.7 MG/DL
CHLORIDE SERPL-SCNC: 104 MMOL/L
CHOLEST SERPL-MCNC: 165 MG/DL
CK SERPL-CCNC: 222 U/L
CO2 SERPL-SCNC: 25 MMOL/L
CREAT SERPL-MCNC: 1.17 MG/DL
CRP SERPL HS-MCNC: 0.18 MG/L
EGFR: 72 ML/MIN/1.73M2
ESTIMATED AVERAGE GLUCOSE: 126 MG/DL
GLUCOSE SERPL-MCNC: 103 MG/DL
HBA1C MFR BLD HPLC: 6 %
HDLC SERPL-MCNC: 53 MG/DL
LDLC SERPL CALC-MCNC: 101 MG/DL
NONHDLC SERPL-MCNC: 112 MG/DL
POTASSIUM SERPL-SCNC: 4.8 MMOL/L
PROT SERPL-MCNC: 7.3 G/DL
SODIUM SERPL-SCNC: 142 MMOL/L
TRIGL SERPL-MCNC: 53 MG/DL
TSH SERPL-ACNC: 1.42 UIU/ML

## 2024-08-02 RX ORDER — EZETIMIBE 10 MG/1
10 TABLET ORAL DAILY
Qty: 90 | Refills: 3 | Status: ACTIVE | COMMUNITY
Start: 2024-08-02 | End: 1900-01-01

## 2024-10-24 ENCOUNTER — RX RENEWAL (OUTPATIENT)
Age: 59
End: 2024-10-24

## 2025-02-26 ENCOUNTER — NON-APPOINTMENT (OUTPATIENT)
Age: 60
End: 2025-02-26

## 2025-02-26 ENCOUNTER — APPOINTMENT (OUTPATIENT)
Dept: CARDIOLOGY | Facility: CLINIC | Age: 60
End: 2025-02-26
Payer: COMMERCIAL

## 2025-02-26 VITALS
HEART RATE: 58 BPM | HEIGHT: 65 IN | BODY MASS INDEX: 24.99 KG/M2 | SYSTOLIC BLOOD PRESSURE: 128 MMHG | WEIGHT: 150 LBS | OXYGEN SATURATION: 96 % | DIASTOLIC BLOOD PRESSURE: 78 MMHG

## 2025-02-26 DIAGNOSIS — I25.10 ATHEROSCLEROTIC HEART DISEASE OF NATIVE CORONARY ARTERY W/OUT ANGINA PECTORIS: ICD-10-CM

## 2025-02-26 DIAGNOSIS — R73.03 PREDIABETES.: ICD-10-CM

## 2025-02-26 DIAGNOSIS — E78.5 HYPERLIPIDEMIA, UNSPECIFIED: ICD-10-CM

## 2025-02-26 DIAGNOSIS — R07.89 OTHER CHEST PAIN: ICD-10-CM

## 2025-02-26 LAB
ESTIMATED AVERAGE GLUCOSE: 131 MG/DL
HBA1C MFR BLD HPLC: 6.2 %
HCT VFR BLD CALC: 45.7 %
HGB BLD-MCNC: 14.7 G/DL
MCHC RBC-ENTMCNC: 28.4 PG
MCHC RBC-ENTMCNC: 32.2 G/DL
MCV RBC AUTO: 88.4 FL
PLATELET # BLD AUTO: 239 K/UL
RBC # BLD: 5.17 M/UL
RBC # FLD: 13.4 %
WBC # FLD AUTO: 6.63 K/UL

## 2025-02-26 PROCEDURE — 99214 OFFICE O/P EST MOD 30 MIN: CPT | Mod: 25

## 2025-02-26 PROCEDURE — 93000 ELECTROCARDIOGRAM COMPLETE: CPT

## 2025-02-27 ENCOUNTER — NON-APPOINTMENT (OUTPATIENT)
Age: 60
End: 2025-02-27

## 2025-02-27 LAB — TSH SERPL-ACNC: 1.69 UIU/ML

## 2025-03-06 ENCOUNTER — NON-APPOINTMENT (OUTPATIENT)
Age: 60
End: 2025-03-06

## 2025-03-06 LAB
ALBUMIN SERPL ELPH-MCNC: 4.4 G/DL
ALP BLD-CCNC: 107 U/L
ALT SERPL-CCNC: 29 U/L
ANION GAP SERPL CALC-SCNC: 12 MMOL/L
AST SERPL-CCNC: 29 U/L
BILIRUB SERPL-MCNC: 0.4 MG/DL
BUN SERPL-MCNC: 22 MG/DL
CALCIUM SERPL-MCNC: 9.7 MG/DL
CHLORIDE SERPL-SCNC: 107 MMOL/L
CHOLEST SERPL-MCNC: 113 MG/DL
CK SERPL-CCNC: 229 U/L
CO2 SERPL-SCNC: 27 MMOL/L
CREAT SERPL-MCNC: 1 MG/DL
EGFR: 87 ML/MIN/1.73M2
GLUCOSE SERPL-MCNC: 94 MG/DL
HDLC SERPL-MCNC: 52 MG/DL
LDLC SERPL CALC-MCNC: 47 MG/DL
NONHDLC SERPL-MCNC: 61 MG/DL
POTASSIUM SERPL-SCNC: 5.1 MMOL/L
PROT SERPL-MCNC: 7.5 G/DL
SODIUM SERPL-SCNC: 146 MMOL/L
TRIGL SERPL-MCNC: 64 MG/DL

## 2025-03-15 ENCOUNTER — APPOINTMENT (OUTPATIENT)
Dept: CARDIOLOGY | Facility: CLINIC | Age: 60
End: 2025-03-15
Payer: COMMERCIAL

## 2025-03-15 PROCEDURE — 93306 TTE W/DOPPLER COMPLETE: CPT

## 2025-03-18 ENCOUNTER — NON-APPOINTMENT (OUTPATIENT)
Age: 60
End: 2025-03-18

## 2025-03-20 ENCOUNTER — APPOINTMENT (OUTPATIENT)
Dept: CARDIOLOGY | Facility: CLINIC | Age: 60
End: 2025-03-20
Payer: COMMERCIAL

## 2025-03-20 PROCEDURE — 93015 CV STRESS TEST SUPVJ I&R: CPT

## 2025-03-20 PROCEDURE — 78452 HT MUSCLE IMAGE SPECT MULT: CPT

## 2025-03-20 PROCEDURE — A9502: CPT

## 2025-07-30 ENCOUNTER — RX RENEWAL (OUTPATIENT)
Age: 60
End: 2025-07-30

## 2025-09-03 ENCOUNTER — APPOINTMENT (OUTPATIENT)
Dept: CARDIOLOGY | Facility: CLINIC | Age: 60
End: 2025-09-03
Payer: COMMERCIAL

## 2025-09-03 VITALS
OXYGEN SATURATION: 95 % | BODY MASS INDEX: 24.66 KG/M2 | HEART RATE: 66 BPM | WEIGHT: 148 LBS | DIASTOLIC BLOOD PRESSURE: 82 MMHG | SYSTOLIC BLOOD PRESSURE: 132 MMHG | HEIGHT: 65 IN

## 2025-09-03 DIAGNOSIS — I25.10 ATHEROSCLEROTIC HEART DISEASE OF NATIVE CORONARY ARTERY W/OUT ANGINA PECTORIS: ICD-10-CM

## 2025-09-03 DIAGNOSIS — R73.03 PREDIABETES.: ICD-10-CM

## 2025-09-03 DIAGNOSIS — E78.5 HYPERLIPIDEMIA, UNSPECIFIED: ICD-10-CM

## 2025-09-03 PROCEDURE — 93000 ELECTROCARDIOGRAM COMPLETE: CPT

## 2025-09-03 PROCEDURE — 99214 OFFICE O/P EST MOD 30 MIN: CPT | Mod: 25
